# Patient Record
Sex: FEMALE | Race: BLACK OR AFRICAN AMERICAN | NOT HISPANIC OR LATINO | Employment: OTHER | URBAN - METROPOLITAN AREA
[De-identification: names, ages, dates, MRNs, and addresses within clinical notes are randomized per-mention and may not be internally consistent; named-entity substitution may affect disease eponyms.]

---

## 2018-12-22 ENCOUNTER — HOSPITAL ENCOUNTER (INPATIENT)
Facility: HOSPITAL | Age: 83
LOS: 6 days | DRG: 690 | End: 2018-12-28
Attending: EMERGENCY MEDICINE | Admitting: EMERGENCY MEDICINE
Payer: MEDICARE

## 2018-12-22 DIAGNOSIS — E87.6 HYPOKALEMIA: ICD-10-CM

## 2018-12-22 DIAGNOSIS — E86.0 DEHYDRATION: ICD-10-CM

## 2018-12-22 DIAGNOSIS — R41.82 MENTAL STATUS, DECREASED: Primary | ICD-10-CM

## 2018-12-22 DIAGNOSIS — N17.9 ACUTE RENAL FAILURE, UNSPECIFIED ACUTE RENAL FAILURE TYPE: ICD-10-CM

## 2018-12-22 DIAGNOSIS — I48.91 ATRIAL FIBRILLATION: ICD-10-CM
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
## 2018-12-22 DIAGNOSIS — N39.0 URINARY TRACT INFECTION WITHOUT HEMATURIA, SITE UNSPECIFIED: ICD-10-CM

## 2018-12-22 PROBLEM — G93.49 INFECTIOUS ENCEPHALOPATHY: Status: ACTIVE | Noted: 2018-12-22

## 2018-12-22 PROBLEM — D64.9 ANEMIA: Chronic | Status: ACTIVE | Noted: 2018-12-22

## 2018-12-22 PROBLEM — B99.9 INFECTIOUS ENCEPHALOPATHY: Status: ACTIVE | Noted: 2018-12-22

## 2018-12-22 PROBLEM — I10 ESSENTIAL HYPERTENSION: Chronic | Status: ACTIVE | Noted: 2018-12-22

## 2018-12-22 PROBLEM — N30.00 ACUTE CYSTITIS WITHOUT HEMATURIA: Status: ACTIVE | Noted: 2018-12-22

## 2018-12-22 PROBLEM — E44.0 MODERATE PROTEIN-CALORIE MALNUTRITION: Chronic | Status: ACTIVE | Noted: 2018-12-22

## 2018-12-22 PROBLEM — F01.50 VASCULAR DEMENTIA WITHOUT BEHAVIORAL DISTURBANCE: Chronic | Status: ACTIVE | Noted: 2018-12-22

## 2018-12-22 LAB
ALBUMIN SERPL BCP-MCNC: 3.3 G/DL
ALP SERPL-CCNC: 65 U/L
ALT SERPL W/O P-5'-P-CCNC: 16 U/L
ANION GAP SERPL CALC-SCNC: 15 MMOL/L
AST SERPL-CCNC: 35 U/L
BACTERIA #/AREA URNS HPF: ABNORMAL /HPF
BASOPHILS # BLD AUTO: 0.01 K/UL
BASOPHILS NFR BLD: 0.1 %
BILIRUB SERPL-MCNC: 2.1 MG/DL
BILIRUB UR QL STRIP: NEGATIVE
BNP SERPL-MCNC: 66 PG/ML
BUN SERPL-MCNC: 30 MG/DL
CALCIUM SERPL-MCNC: 8.4 MG/DL
CHLORIDE SERPL-SCNC: 104 MMOL/L
CLARITY UR: ABNORMAL
CO2 SERPL-SCNC: 24 MMOL/L
COLOR UR: ABNORMAL
CREAT SERPL-MCNC: 2.4 MG/DL
CREAT UR-MCNC: 238.3 MG/DL
DIFFERENTIAL METHOD: ABNORMAL
EOSINOPHIL # BLD AUTO: 0.1 K/UL
EOSINOPHIL NFR BLD: 0.6 %
ERYTHROCYTE [DISTWIDTH] IN BLOOD BY AUTOMATED COUNT: 15.1 %
EST. GFR  (AFRICAN AMERICAN): 20 ML/MIN/1.73 M^2
EST. GFR  (NON AFRICAN AMERICAN): 18 ML/MIN/1.73 M^2
GLUCOSE SERPL-MCNC: 100 MG/DL
GLUCOSE UR QL STRIP: NEGATIVE
HCT VFR BLD AUTO: 33 %
HGB BLD-MCNC: 11.5 G/DL
HGB UR QL STRIP: ABNORMAL
HYALINE CASTS #/AREA URNS LPF: 10 /LPF
INR PPP: 1.1
KETONES UR QL STRIP: ABNORMAL
LACTATE SERPL-SCNC: 1 MMOL/L
LEUKOCYTE ESTERASE UR QL STRIP: ABNORMAL
LYMPHOCYTES # BLD AUTO: 1.1 K/UL
LYMPHOCYTES NFR BLD: 10.6 %
MAGNESIUM SERPL-MCNC: 1.8 MG/DL
MCH RBC QN AUTO: 31.2 PG
MCHC RBC AUTO-ENTMCNC: 34.8 G/DL
MCV RBC AUTO: 89 FL
MICROSCOPIC COMMENT: ABNORMAL
MONOCYTES # BLD AUTO: 1.4 K/UL
MONOCYTES NFR BLD: 13.8 %
NEUTROPHILS # BLD AUTO: 7.7 K/UL
NEUTROPHILS NFR BLD: 74.9 %
NITRITE UR QL STRIP: NEGATIVE
PH UR STRIP: 7 [PH] (ref 5–8)
PLATELET # BLD AUTO: 248 K/UL
PMV BLD AUTO: 9.6 FL
POCT GLUCOSE: 96 MG/DL (ref 70–110)
POTASSIUM SERPL-SCNC: 2.3 MMOL/L
PROT SERPL-MCNC: 7.8 G/DL
PROT UR QL STRIP: ABNORMAL
PROTHROMBIN TIME: 11.4 SEC
RBC # BLD AUTO: 3.69 M/UL
RBC #/AREA URNS HPF: 1 /HPF (ref 0–4)
SODIUM SERPL-SCNC: 143 MMOL/L
SODIUM UR-SCNC: 44 MMOL/L
SP GR UR STRIP: 1.01 (ref 1–1.03)
SQUAMOUS #/AREA URNS HPF: 3 /HPF
TROPONIN I SERPL DL<=0.01 NG/ML-MCNC: 0.04 NG/ML
TROPONIN I SERPL DL<=0.01 NG/ML-MCNC: 0.05 NG/ML
TROPONIN I SERPL DL<=0.01 NG/ML-MCNC: 0.06 NG/ML
URN SPEC COLLECT METH UR: ABNORMAL
UROBILINOGEN UR STRIP-ACNC: ABNORMAL EU/DL
WBC # BLD AUTO: 10.24 K/UL
WBC #/AREA URNS HPF: 20 /HPF (ref 0–5)

## 2018-12-22 PROCEDURE — 84300 ASSAY OF URINE SODIUM: CPT

## 2018-12-22 PROCEDURE — 63600175 PHARM REV CODE 636 W HCPCS: Performed by: EMERGENCY MEDICINE

## 2018-12-22 PROCEDURE — P9612 CATHETERIZE FOR URINE SPEC: HCPCS

## 2018-12-22 PROCEDURE — 96365 THER/PROPH/DIAG IV INF INIT: CPT

## 2018-12-22 PROCEDURE — 83735 ASSAY OF MAGNESIUM: CPT

## 2018-12-22 PROCEDURE — 93005 ELECTROCARDIOGRAM TRACING: CPT

## 2018-12-22 PROCEDURE — 82570 ASSAY OF URINE CREATININE: CPT

## 2018-12-22 PROCEDURE — 83605 ASSAY OF LACTIC ACID: CPT

## 2018-12-22 PROCEDURE — 85025 COMPLETE CBC W/AUTO DIFF WBC: CPT

## 2018-12-22 PROCEDURE — 87088 URINE BACTERIA CULTURE: CPT

## 2018-12-22 PROCEDURE — 80053 COMPREHEN METABOLIC PANEL: CPT

## 2018-12-22 PROCEDURE — 11000001 HC ACUTE MED/SURG PRIVATE ROOM

## 2018-12-22 PROCEDURE — 87086 URINE CULTURE/COLONY COUNT: CPT

## 2018-12-22 PROCEDURE — 25000003 PHARM REV CODE 250: Performed by: EMERGENCY MEDICINE

## 2018-12-22 PROCEDURE — 85610 PROTHROMBIN TIME: CPT

## 2018-12-22 PROCEDURE — 93010 ELECTROCARDIOGRAM REPORT: CPT | Mod: ,,, | Performed by: INTERNAL MEDICINE

## 2018-12-22 PROCEDURE — 84484 ASSAY OF TROPONIN QUANT: CPT

## 2018-12-22 PROCEDURE — 96361 HYDRATE IV INFUSION ADD-ON: CPT

## 2018-12-22 PROCEDURE — 81000 URINALYSIS NONAUTO W/SCOPE: CPT

## 2018-12-22 PROCEDURE — 83880 ASSAY OF NATRIURETIC PEPTIDE: CPT

## 2018-12-22 PROCEDURE — 93010 EKG 12-LEAD: ICD-10-PCS | Mod: ,,, | Performed by: INTERNAL MEDICINE

## 2018-12-22 PROCEDURE — 82962 GLUCOSE BLOOD TEST: CPT

## 2018-12-22 PROCEDURE — 99285 EMERGENCY DEPT VISIT HI MDM: CPT | Mod: 25

## 2018-12-22 PROCEDURE — 87077 CULTURE AEROBIC IDENTIFY: CPT

## 2018-12-22 PROCEDURE — 87186 SC STD MICRODIL/AGAR DIL: CPT

## 2018-12-22 PROCEDURE — 36415 COLL VENOUS BLD VENIPUNCTURE: CPT

## 2018-12-22 RX ORDER — LABETALOL HYDROCHLORIDE 5 MG/ML
10 INJECTION, SOLUTION INTRAVENOUS
Status: DISCONTINUED | OUTPATIENT
Start: 2018-12-22 | End: 2018-12-22

## 2018-12-22 RX ORDER — SODIUM CHLORIDE 9 MG/ML
125 INJECTION, SOLUTION INTRAVENOUS ONCE
Status: COMPLETED | OUTPATIENT
Start: 2018-12-22 | End: 2018-12-22

## 2018-12-22 RX ORDER — LOSARTAN POTASSIUM 25 MG/1
100 TABLET ORAL DAILY
Status: DISCONTINUED | OUTPATIENT
Start: 2018-12-22 | End: 2018-12-28 | Stop reason: HOSPADM

## 2018-12-22 RX ORDER — DONEPEZIL HYDROCHLORIDE 5 MG/1
5 TABLET, FILM COATED ORAL NIGHTLY
Status: DISCONTINUED | OUTPATIENT
Start: 2018-12-22 | End: 2018-12-28 | Stop reason: HOSPADM

## 2018-12-22 RX ORDER — ENOXAPARIN SODIUM 100 MG/ML
30 INJECTION SUBCUTANEOUS EVERY 24 HOURS
Status: DISCONTINUED | OUTPATIENT
Start: 2018-12-22 | End: 2018-12-24

## 2018-12-22 RX ORDER — AMLODIPINE BESYLATE 5 MG/1
10 TABLET ORAL DAILY
Status: DISCONTINUED | OUTPATIENT
Start: 2018-12-22 | End: 2018-12-28 | Stop reason: HOSPADM

## 2018-12-22 RX ORDER — CLONIDINE HYDROCHLORIDE 0.1 MG/1
0.1 TABLET ORAL EVERY 4 HOURS PRN
Status: DISCONTINUED | OUTPATIENT
Start: 2018-12-22 | End: 2018-12-27

## 2018-12-22 RX ADMIN — POTASSIUM CHLORIDE: 2 INJECTION, SOLUTION, CONCENTRATE INTRAVENOUS at 10:12

## 2018-12-22 RX ADMIN — CEFTRIAXONE 1 G: 1 INJECTION, SOLUTION INTRAVENOUS at 12:12

## 2018-12-22 RX ADMIN — POTASSIUM CHLORIDE: 2 INJECTION, SOLUTION, CONCENTRATE INTRAVENOUS at 01:12

## 2018-12-22 RX ADMIN — ENOXAPARIN SODIUM 30 MG: 100 INJECTION SUBCUTANEOUS at 06:12

## 2018-12-22 RX ADMIN — SODIUM CHLORIDE 125 ML/HR: 0.9 INJECTION, SOLUTION INTRAVENOUS at 11:12

## 2018-12-22 RX ADMIN — POTASSIUM CHLORIDE: 2 INJECTION, SOLUTION, CONCENTRATE INTRAVENOUS at 12:12

## 2018-12-22 NOTE — ASSESSMENT & PLAN NOTE
She has amlodipine 10 and losartan 100 listed on home med list. Hypertensive on admit. Cont home meds with PRN.

## 2018-12-22 NOTE — NURSING
"Patient's family at the bedside. Patient's daughter says that her and her brother are not able to provide the care that their mom needs. "She needs 24 hour care and we cant do that." The patient has had several falls over the last few days. She consistently tries to get out of the bed. Patient has not had a bowel movement in over week. Daughter also says that patient's urine is very dark and has a bad odor.   "

## 2018-12-22 NOTE — ASSESSMENT & PLAN NOTE
Cr 2.4 on admission. She has renal insufficiency listed under her medical problems in Epic, but her previous Cr values which are from 2015 were all 0.8. She appears volume depleted on exam. She was started on IV fluids. Monitor BMP. Retroperitoneal US pending.

## 2018-12-22 NOTE — ASSESSMENT & PLAN NOTE
Suspect acute delirium from infection in addition to chronic dementia. Treat underlying infection and monitor.

## 2018-12-22 NOTE — ASSESSMENT & PLAN NOTE
Unclear etiology.  No previous workup appreciated in chart.  No obvious bleeding and H/H lower than previously documented 3 years ago.  Will get workup ordered for am blood draw and monitor.

## 2018-12-22 NOTE — ED TRIAGE NOTES
"Pt arrived to the ED via EMS with c/o fall. Per pt daughter, "states her mother has been having multiple falls this week with last one on last night and more confused. Says she went to mother home and mother didn't recognize her". On admit to the hospital, pt is AAOx 2 pt not oriented to time. NAD noted. Pt denies chest pain/discomfort, SOB, fevers, but reports knee pain and weakness. Dr. Gastelum at bedside. Bed locked and in lowest position, SR up x2, call light within reach. Will continue to monitor and care for patient.  "

## 2018-12-22 NOTE — ED PROVIDER NOTES
"Encounter Date: 12/22/2018    SCRIBE #1 NOTE: I, Nadine Puneet, am scribing for, and in the presence of,  Jamie Gastelum MD. I have scribed the following portions of the note - Other sections scribed: HPI and ROS.       History     Chief Complaint   Patient presents with    Fall     family reports multiple falls over past two days, due to increased weakness, Hx of dementia. C-collar in place     CC: Fall    HPI: This 87 y.o. Female with PMHx of dementia, Hypertension, Renal disorder, and Stroke presents to the ED for an evaluation following 2 falls that occurred in the past 2 days. Pt's daughter states pt stayed in the xavier chair all night, and pt looked confused so daughter brought pt to the ED. Daughter states pt normally walks with a cane. Pt reports neck pain secondary to fall. Pt also reports chronic right knee pain due to arthritis. Daughter states pt has not been eating as much as usual. Pt denies fever, SOB, chest pain, and any other associated symptoms.        The history is provided by the patient. No  was used.     Review of patient's allergies indicates:   Allergen Reactions    Aspirin Other (See Comments)     Pt states " I get nervous"     Past Medical History:   Diagnosis Date    Dementia     Hypertension     Renal disorder     Stroke      Past Surgical History:   Procedure Laterality Date    CHOLECYSTECTOMY-LAPAROSCOPIC N/A 9/25/2015    Performed by Cristian Escalera MD at [facility]     History reviewed. No pertinent family history.  Social History     Tobacco Use    Smoking status: Former Smoker    Smokeless tobacco: Never Used   Substance Use Topics    Alcohol use: No    Drug use: No     Review of Systems   Constitutional: Negative for chills, diaphoresis and fever.   HENT: Negative for ear pain and sore throat.    Eyes: Negative for pain.   Respiratory: Negative for cough and shortness of breath.    Cardiovascular: Negative for chest pain.   Gastrointestinal: " Negative for abdominal pain, diarrhea, nausea and vomiting.   Genitourinary: Negative for dysuria.   Musculoskeletal: Positive for arthralgias (chronic right knee pain) and neck pain. Negative for back pain.   Skin: Negative for rash.   Neurological: Negative for headaches.   Psychiatric/Behavioral: Positive for confusion.       Physical Exam     Initial Vitals [12/22/18 1004]   BP Pulse Resp Temp SpO2   (!) 190/77 85 18 98.8 °F (37.1 °C) (!) 92 %      MAP       --         Physical Exam    Nursing note and vitals reviewed.  Constitutional: She appears well-developed and well-nourished. She appears distressed.   HENT:   Dry oropharynx   Eyes: EOM are normal. Pupils are equal, round, and reactive to light.   Neck: Normal range of motion. Neck supple. No thyromegaly present. No JVD present.   Cardiovascular: Normal rate, regular rhythm, normal heart sounds and intact distal pulses. Exam reveals no gallop and no friction rub.    No murmur heard.  Pulmonary/Chest: Breath sounds normal. No respiratory distress.   Abdominal: Soft. Bowel sounds are normal. There is no tenderness.   Musculoskeletal: Normal range of motion. She exhibits no edema or tenderness.   Neurological: She is oriented to person, place, and time.   Lethargic but able to answer questions.  Global weakness but no focal deficit   Skin: Skin is warm and dry.   Poor skin turgor         ED Course   Critical Care  Date/Time: 12/22/2018 6:53 PM  Performed by: Jamie Gastelum MD  Authorized by: Jamie Gastelum MD   Direct patient critical care time: 20 minutes  Additional history critical care time: 10 minutes  Ordering / reviewing critical care time: 10 minutes  Documentation critical care time: 10 minutes  Consulting other physicians critical care time: 10 minutes  Consult with family critical care time: 10 minutes  Other critical care time: 10 (admission) minutes  Total critical care time (exclusive of procedural time) : 80 minutes  Critical care  time was exclusive of separately billable procedures and treating other patients and teaching time.  Critical care was necessary to treat or prevent imminent or life-threatening deterioration of the following conditions: dehydration, renal failure and CNS failure or compromise.  Critical care was time spent personally by me on the following activities: development of treatment plan with patient or surrogate, discussions with consultants, interpretation of cardiac output measurements, evaluation of patient's response to treatment, examination of patient, obtaining history from patient or surrogate, ordering and performing treatments and interventions, ordering and review of laboratory studies, ordering and review of radiographic studies, pulse oximetry, re-evaluation of patient's condition and review of old charts.        Labs Reviewed   CBC W/ AUTO DIFFERENTIAL - Abnormal; Notable for the following components:       Result Value    RBC 3.69 (*)     Hemoglobin 11.5 (*)     Hematocrit 33.0 (*)     MCH 31.2 (*)     RDW 15.1 (*)     Mono # 1.4 (*)     Gran% 74.9 (*)     Lymph% 10.6 (*)     All other components within normal limits   COMPREHENSIVE METABOLIC PANEL - Abnormal; Notable for the following components:    Potassium 2.3 (*)     BUN, Bld 30 (*)     Creatinine 2.4 (*)     Calcium 8.4 (*)     Albumin 3.3 (*)     Total Bilirubin 2.1 (*)     eGFR if  20 (*)     eGFR if non  18 (*)     All other components within normal limits    Narrative:     POTASSIUM critical result(s) called and verbal readback obtained from   EL ALANIZ, 12/22/2018 11:31   URINALYSIS - Abnormal; Notable for the following components:    Appearance, UA Cloudy (*)     Protein, UA 2+ (*)     Ketones, UA Trace (*)     Occult Blood UA 1+ (*)     Urobilinogen, UA 4.0-6.0 (*)     Leukocytes, UA 3+ (*)     All other components within normal limits   TROPONIN I - Abnormal; Notable for the following components:     Troponin I 0.063 (*)     All other components within normal limits   URINALYSIS MICROSCOPIC - Abnormal; Notable for the following components:    WBC, UA 20 (*)     Bacteria, UA Many (*)     Hyaline Casts, UA 10 (*)     All other components within normal limits   TROPONIN I - Abnormal; Notable for the following components:    Troponin I 0.054 (*)     All other components within normal limits   CULTURE, URINE   LACTIC ACID, PLASMA   PROTIME-INR   B-TYPE NATRIURETIC PEPTIDE   MAGNESIUM   CREATININE, URINE, RANDOM   SODIUM, URINE, RANDOM   POCT GLUCOSE   POCT GLUCOSE MONITORING CONTINUOUS          Imaging Results          X-Ray Chest AP Portable (Final result)  Result time 12/22/18 11:29:57    Final result by Oziel Ann MD (12/22/18 11:29:57)                 Impression:      No focal lobar consolidation.      Electronically signed by: Oziel Ann MD  Date:    12/22/2018  Time:    11:29             Narrative:    EXAMINATION:  XR CHEST AP PORTABLE    CLINICAL HISTORY:  cough;    TECHNIQUE:  Single frontal view of the chest was performed.    COMPARISON:  02/06/2015    FINDINGS:  Cardiovascular silhouette borderline prominent.  Atherosclerotic change within the aorta.  The lungs are clear.  No evidence for congestion, effusion, or infiltrate.                               CT Head Without Contrast (Final result)  Result time 12/22/18 11:23:02    Final result by Rachana Reynolds MD (12/22/18 11:23:02)                 Impression:      No acute intracranial abnormality.      Electronically signed by: Rachana Reynolds MD  Date:    12/22/2018  Time:    11:23             Narrative:    EXAMINATION:  CT HEAD WITHOUT CONTRAST    CLINICAL HISTORY:  Confusion/delirium, altered LOC, unexplained;    TECHNIQUE:  Low dose axial images were obtained through the head.  Coronal and sagittal reformations were also performed. Contrast was not administered.    COMPARISON:  None.    FINDINGS:  The brain parenchyma demonstrates no  evidence of intracranial hemorrhage, major vascular distribution infarct or mass effect.  There is generalized cerebral and cerebellar volume loss with the frontal predominance, similar to previous exams.  There is stable chronic microvascular ischemic change.  There are no extra-axial masses or fluid collections.  The ventricular system is of normal size for age and midline.    Visualized paranasal sinuses and mastoid air cells are clear.  There is no evidence of skull fracture.  A stable calcified meningioma is present along the cribriform plate, measuring approximately 1.2 cm without surrounding edema..                               CT CERVICAL SPINE WITHOUT CONTRAST (Final result)  Result time 12/22/18 11:29:59    Final result by Rachana Reynolds MD (12/22/18 11:29:59)                 Impression:      No acute fracture.    Osteopenia and degenerative change as above.      Electronically signed by: Rachana Reynolds MD  Date:    12/22/2018  Time:    11:29             Narrative:    EXAMINATION:  CT CERVICAL SPINE WITHOUT CONTRAST    CLINICAL HISTORY:  neck pain;    TECHNIQUE:  Low dose axial images, sagittal and coronal reformations were performed though the cervical spine.  Contrast was not administered.    COMPARISON:  Prior dated 11/22/2015.    FINDINGS:  There is diffuse osteopenia.  There is no acute fracture, dislocation, or bone destruction.  There is minimal grade 1 anterolisthesis of C5-6.  Mild disc height narrowing is present at the lower lumbar levels.  Endplate degenerative change with marginal osteophyte formation as well as uncovertebral spurring and facet arthropathy are present throughout the cervical spine.  These degenerative changes are associated with neural foraminal narrowing at C3-4 on the left C4-5 and C5-6 and C6-7 bilaterally.  Degenerative changes appear similar to previous exam.    There are extensive vascular calcifications noted at the carotid bulbs and subclavian vessels.                                 patient has severe dehydration with severe hypokalemia and acute renal failure also with UTI.  Causing decreased mental status.  Will admit for IV hydration, potassium replacement, renal failure evaluation.  Discussed with Dr. Josiah Wolf who accepted the patient.             Scribe Attestation:   Scribe #1: I performed the above scribed service and the documentation accurately describes the services I performed. I attest to the accuracy of the note.    Attending Attestation:           Physician Attestation for Scribe:  Physician Attestation Statement for Scribe #1: I, Jamie Gastelum MD, reviewed documentation, as scribed by Nadine Teixeira in my presence, and it is both accurate and complete.                    Clinical Impression:   The primary encounter diagnosis was Mental status, decreased. Diagnoses of Fall, Dehydration, Urinary tract infection without hematuria, site unspecified, Acute renal failure, unspecified acute renal failure type, and Hypokalemia were also pertinent to this visit.                             Jamie Gastelum MD  12/22/18 7418

## 2018-12-22 NOTE — H&P
[facility] Hospital Medicine  History & Physical    Patient Name: Nik Fakl  MRN: [id number]  Admission Date: 2018  Attending Physician: Purnima Clarke MD  Primary Care Provider: Ronen Leong MD         Patient information was obtained from patient, past medical records and ER records.     Subjective:     Principal Problem:Acute cystitis without hematuria    Chief Complaint:   Chief Complaint   Patient presents with    Fall     family reports multiple falls over past two days, due to increased weakness, Hx of dementia. C-collar in place        HPI: Ms. Falk is an 86 yo woman with advanced dementia, h/o stroke, essential hypertension, and questionable h/o CKD who presented to the ER with her daughter for confusion and falls. History is limited due to patient's dementia. Attempts to contact her daughter Yamilex x2 unsuccessful. Per the ER physician, the daughter reports that her mom normally ambulates adequately with a cane at home but has fallen twice in the past two days. The patient has reprotedly not been eating as much as usual. She didn't seem to recognize her daughter and was confused this morning which prompted the patient's daughter to bring her to the ER.  The patient denies current fevers, chills, abdominal discomfort, pelvic discomfort, chest pain. She does report knee pain. In the ER the patient was confused believing that she was at home but was oriented to her name and . Could not tell me the year. Was able to tell me her daughter's name is Yamilex.  Lab evaluation revealed likely pre-renal ARF as well as findings consistent with acute bacterial cystitis on UA. The patient was admitted to hospital medicine for further care.    Past Medical History:   Diagnosis Date    Dementia     Hypertension     Renal disorder     Stroke        Past Surgical History:   Procedure Laterality Date    CHOLECYSTECTOMY-LAPAROSCOPIC N/A 2015    Performed by Cristian MITTAL  "MD Jw at [facility] OR       Review of patient's allergies indicates:   Allergen Reactions    Aspirin Other (See Comments)     Pt states " I get nervous"       No current facility-administered medications on file prior to encounter.      Current Outpatient Medications on File Prior to Encounter   Medication Sig    amlodipine (NORVASC) 10 MG tablet Take 10 mg by mouth once daily.    aspirin (ECOTRIN) 81 MG EC tablet Take 81 mg by mouth once daily.    brimonidine-timolol (COMBIGAN) 0.2-0.5 % Drop Place 1 drop into both eyes.    losartan (COZAAR) 100 MG tablet Take 100 mg by mouth once daily.    donepezil (ARICEPT) 5 MG tablet Take 5 mg by mouth 2 (two) times daily.    montelukast (SINGULAIR) 10 mg tablet Take 10 mg by mouth once daily.     tramadol (ULTRAM) 50 mg tablet Take 50 mg by mouth every evening.     trazodone (DESYREL) 50 MG tablet Take 50 mg by mouth every evening.     Family History     None        Tobacco Use    Smoking status: Former Smoker    Smokeless tobacco: Never Used   Substance and Sexual Activity    Alcohol use: No    Drug use: No    Sexual activity: Not Currently     Review of Systems   Unable to perform ROS: Dementia     Objective:     Vital Signs (Most Recent):  Temp: 98.8 °F (37.1 °C) (12/22/18 1004)  Pulse: 83 (12/22/18 1302)  Resp: 18 (12/22/18 1004)  BP: (!) 176/85 (12/22/18 1302)  SpO2: 100 % (12/22/18 1302) Vital Signs (24h Range):  Temp:  [98.8 °F (37.1 °C)] 98.8 °F (37.1 °C)  Pulse:  [82-85] 83  Resp:  [18] 18  SpO2:  [92 %-100 %] 100 %  BP: (154-190)/(69-85) 176/85     Weight: 77.1 kg (170 lb)  Body mass index is 28.29 kg/m².    Physical Exam   Constitutional: She appears well-developed and well-nourished. No distress.   HENT:   Right Ear: External ear normal.   Left Ear: External ear normal.   Nose: Nose normal.   Eyes: EOM are normal. Pupils are equal, round, and reactive to light. No scleral icterus.   Neck: Normal range of motion. Neck supple. No thyromegaly " present.   Cardiovascular: Normal heart sounds. Exam reveals no friction rub.   No murmur heard.  Occasional irregularity. Good radial pulses.   Pulmonary/Chest: Effort normal and breath sounds normal. No respiratory distress. She has no wheezes. She has no rales.   Abdominal: Soft. Bowel sounds are normal. She exhibits no mass. There is no tenderness.   No hepatosplenomegaly   Musculoskeletal: Normal range of motion. She exhibits no edema or deformity.   Neurological: She is alert. No cranial nerve deficit.   Oriented to self and , not oriented to place or year.   Skin: Skin is warm and dry. Capillary refill takes less than 2 seconds. No pallor.         CRANIAL NERVES     CN III, IV, VI   Pupils are equal, round, and reactive to light.  Extraocular motions are normal.        Significant Labs: All pertinent labs within the past 24 hours have been reviewed.    Significant Imaging: I have reviewed and interpreted all pertinent imaging results/findings within the past 24 hours.    Assessment/Plan:     * Acute cystitis without hematuria    Patient presented with confusion suspected to be due to infectious encephalopathy from UTI. UA consistent with UTI. UCx pending. She was started empirically on ceftriaxone.      ARF (acute renal failure)    Cr 2.4 on admission. She has renal insufficiency listed under her medical problems in Epic, but her previous Cr values which are from 2015 were all 0.8. She appears volume depleted on exam. She was started on IV fluids. Monitor BMP. Retroperitoneal US pending.     Infectious encephalopathy    Suspect acute delirium from infection in addition to chronic dementia. Treat underlying infection and monitor.     Moderate protein-calorie malnutrition    Thin with muscle wasting. Supplement when more alert and safe to take PO.     Vascular dementia without behavioral disturbance    Suspected vascular dementia with past history of stroke. Cont home Aricept.     Hypokalemia    Replete and  monitor.       Essential hypertension    She has amlodipine 10 and losartan 100 listed on home med list. Hypertensive on admit. Cont home meds with PRN.       VTE Risk Mitigation (From admission, onward)        Ordered     enoxaparin injection 30 mg  Daily      12/22/18 1329     IP VTE HIGH RISK PATIENT  Once      12/22/18 1329             Purnima Clarke MD  Department of Hospital Medicine   [facility]

## 2018-12-22 NOTE — ASSESSMENT & PLAN NOTE
Patient presented with confusion suspected to be due to infectious encephalopathy from UTI. UA consistent with UTI. UCx pending. She was started empirically on ceftriaxone.

## 2018-12-22 NOTE — SUBJECTIVE & OBJECTIVE
"Past Medical History:   Diagnosis Date    Dementia     Hypertension     Renal disorder     Stroke        Past Surgical History:   Procedure Laterality Date    CHOLECYSTECTOMY-LAPAROSCOPIC N/A 9/25/2015    Performed by Cristian Escalera MD at [facility]       Review of patient's allergies indicates:   Allergen Reactions    Aspirin Other (See Comments)     Pt states " I get nervous"       No current facility-administered medications on file prior to encounter.      Current Outpatient Medications on File Prior to Encounter   Medication Sig    amlodipine (NORVASC) 10 MG tablet Take 10 mg by mouth once daily.    aspirin (ECOTRIN) 81 MG EC tablet Take 81 mg by mouth once daily.    brimonidine-timolol (COMBIGAN) 0.2-0.5 % Drop Place 1 drop into both eyes.    losartan (COZAAR) 100 MG tablet Take 100 mg by mouth once daily.    donepezil (ARICEPT) 5 MG tablet Take 5 mg by mouth 2 (two) times daily.    montelukast (SINGULAIR) 10 mg tablet Take 10 mg by mouth once daily.     tramadol (ULTRAM) 50 mg tablet Take 50 mg by mouth every evening.     trazodone (DESYREL) 50 MG tablet Take 50 mg by mouth every evening.     Family History     None        Tobacco Use    Smoking status: Former Smoker    Smokeless tobacco: Never Used   Substance and Sexual Activity    Alcohol use: No    Drug use: No    Sexual activity: Not Currently     Review of Systems   Unable to perform ROS: Dementia     Objective:     Vital Signs (Most Recent):  Temp: 98.8 °F (37.1 °C) (12/22/18 1004)  Pulse: 83 (12/22/18 1302)  Resp: 18 (12/22/18 1004)  BP: (!) 176/85 (12/22/18 1302)  SpO2: 100 % (12/22/18 1302) Vital Signs (24h Range):  Temp:  [98.8 °F (37.1 °C)] 98.8 °F (37.1 °C)  Pulse:  [82-85] 83  Resp:  [18] 18  SpO2:  [92 %-100 %] 100 %  BP: (154-190)/(69-85) 176/85     Weight: 77.1 kg (170 lb)  Body mass index is 28.29 kg/m².    Physical Exam   Constitutional: She appears well-developed and well-nourished. No distress.   HENT:   Right Ear: " External ear normal.   Left Ear: External ear normal.   Nose: Nose normal.   Eyes: EOM are normal. Pupils are equal, round, and reactive to light. No scleral icterus.   Neck: Normal range of motion. Neck supple. No thyromegaly present.   Cardiovascular: Normal heart sounds. Exam reveals no friction rub.   No murmur heard.  Occasional irregularity. Good radial pulses.   Pulmonary/Chest: Effort normal and breath sounds normal. No respiratory distress. She has no wheezes. She has no rales.   Abdominal: Soft. Bowel sounds are normal. She exhibits no mass. There is no tenderness.   No hepatosplenomegaly   Musculoskeletal: Normal range of motion. She exhibits no edema or deformity.   Neurological: She is alert. No cranial nerve deficit.   Oriented to self and , not oriented to place or year.   Skin: Skin is warm and dry. Capillary refill takes less than 2 seconds. No pallor.         CRANIAL NERVES     CN III, IV, VI   Pupils are equal, round, and reactive to light.  Extraocular motions are normal.        Significant Labs: All pertinent labs within the past 24 hours have been reviewed.    Significant Imaging: I have reviewed and interpreted all pertinent imaging results/findings within the past 24 hours.

## 2018-12-22 NOTE — HPI
Ms. Falk is an 88 yo woman with advanced dementia, h/o stroke, essential hypertension, and questionable h/o CKD who presented to the ER with her daughter for confusion and falls. History is limited due to patient's dementia. Attempts to contact her daughter Yamilex x2 unsuccessful. Per the ER physician, the daughter reports that her mom normally ambulates adequately with a cane at home but has fallen twice in the past two days. The patient has reprotedly not been eating as much as usual. She didn't seem to recognize her daughter and was confused this morning which prompted the patient's daughter to bring her to the ER.  The patient denies current fevers, chills, abdominal discomfort, pelvic discomfort, chest pain. She does report knee pain. In the ER the patient was confused believing that she was at home but was oriented to her name and . Could not tell me the year. Was able to tell me her daughter's name is Yamilex.  Lab evaluation revealed likely pre-renal ARF as well as findings consistent with acute bacterial cystitis on UA. The patient was admitted to hospital medicine for further care.

## 2018-12-23 LAB
ANION GAP SERPL CALC-SCNC: 11 MMOL/L
BASOPHILS # BLD AUTO: 0.01 K/UL
BASOPHILS NFR BLD: 0.1 %
BUN SERPL-MCNC: 22 MG/DL
CALCIUM SERPL-MCNC: 7.5 MG/DL
CHLORIDE SERPL-SCNC: 114 MMOL/L
CO2 SERPL-SCNC: 22 MMOL/L
CREAT SERPL-MCNC: 0.8 MG/DL
DIFFERENTIAL METHOD: ABNORMAL
EOSINOPHIL # BLD AUTO: 0.3 K/UL
EOSINOPHIL NFR BLD: 3.1 %
ERYTHROCYTE [DISTWIDTH] IN BLOOD BY AUTOMATED COUNT: 15.3 %
EST. GFR  (AFRICAN AMERICAN): >60 ML/MIN/1.73 M^2
EST. GFR  (NON AFRICAN AMERICAN): >60 ML/MIN/1.73 M^2
GLUCOSE SERPL-MCNC: 85 MG/DL
HCT VFR BLD AUTO: 30.3 %
HGB BLD-MCNC: 10.4 G/DL
LYMPHOCYTES # BLD AUTO: 2.2 K/UL
LYMPHOCYTES NFR BLD: 26 %
MCH RBC QN AUTO: 30.8 PG
MCHC RBC AUTO-ENTMCNC: 34.3 G/DL
MCV RBC AUTO: 90 FL
MONOCYTES # BLD AUTO: 0.8 K/UL
MONOCYTES NFR BLD: 9.3 %
NEUTROPHILS # BLD AUTO: 5.1 K/UL
NEUTROPHILS NFR BLD: 61.5 %
PLATELET # BLD AUTO: 216 K/UL
PMV BLD AUTO: 9.4 FL
POTASSIUM SERPL-SCNC: 3.1 MMOL/L
RBC # BLD AUTO: 3.38 M/UL
SODIUM SERPL-SCNC: 147 MMOL/L
WBC # BLD AUTO: 8.35 K/UL

## 2018-12-23 PROCEDURE — 25000003 PHARM REV CODE 250: Performed by: INTERNAL MEDICINE

## 2018-12-23 PROCEDURE — 63600175 PHARM REV CODE 636 W HCPCS: Performed by: EMERGENCY MEDICINE

## 2018-12-23 PROCEDURE — 36415 COLL VENOUS BLD VENIPUNCTURE: CPT

## 2018-12-23 PROCEDURE — 25000003 PHARM REV CODE 250: Performed by: HOSPITALIST

## 2018-12-23 PROCEDURE — 85025 COMPLETE CBC W/AUTO DIFF WBC: CPT

## 2018-12-23 PROCEDURE — 80048 BASIC METABOLIC PNL TOTAL CA: CPT

## 2018-12-23 PROCEDURE — 11000001 HC ACUTE MED/SURG PRIVATE ROOM

## 2018-12-23 PROCEDURE — 63600175 PHARM REV CODE 636 W HCPCS: Performed by: HOSPITALIST

## 2018-12-23 PROCEDURE — 94761 N-INVAS EAR/PLS OXIMETRY MLT: CPT

## 2018-12-23 RX ORDER — ASPIRIN 81 MG/1
81 TABLET ORAL DAILY
Status: DISCONTINUED | OUTPATIENT
Start: 2018-12-23 | End: 2018-12-28 | Stop reason: HOSPADM

## 2018-12-23 RX ORDER — TRAMADOL HYDROCHLORIDE 50 MG/1
50 TABLET ORAL NIGHTLY
Status: DISCONTINUED | OUTPATIENT
Start: 2018-12-23 | End: 2018-12-24

## 2018-12-23 RX ORDER — SODIUM CHLORIDE AND POTASSIUM CHLORIDE 150; 450 MG/100ML; MG/100ML
INJECTION, SOLUTION INTRAVENOUS CONTINUOUS
Status: DISCONTINUED | OUTPATIENT
Start: 2018-12-23 | End: 2018-12-24

## 2018-12-23 RX ADMIN — CEFTRIAXONE 1 G: 1 INJECTION, SOLUTION INTRAVENOUS at 12:12

## 2018-12-23 RX ADMIN — ENOXAPARIN SODIUM 30 MG: 100 INJECTION SUBCUTANEOUS at 05:12

## 2018-12-23 RX ADMIN — AMLODIPINE BESYLATE 10 MG: 5 TABLET ORAL at 09:12

## 2018-12-23 RX ADMIN — LOSARTAN POTASSIUM 100 MG: 25 TABLET, FILM COATED ORAL at 09:12

## 2018-12-23 RX ADMIN — CLONIDINE HYDROCHLORIDE 0.1 MG: 0.1 TABLET ORAL at 05:12

## 2018-12-23 RX ADMIN — ASPIRIN 81 MG: 81 TABLET, COATED ORAL at 03:12

## 2018-12-23 RX ADMIN — TRAMADOL HYDROCHLORIDE 50 MG: 50 TABLET, FILM COATED ORAL at 08:12

## 2018-12-23 RX ADMIN — CLONIDINE HYDROCHLORIDE 0.1 MG: 0.1 TABLET ORAL at 01:12

## 2018-12-23 RX ADMIN — SODIUM CHLORIDE AND POTASSIUM CHLORIDE: 4.5; 1.49 INJECTION, SOLUTION INTRAVENOUS at 10:12

## 2018-12-23 RX ADMIN — CLONIDINE HYDROCHLORIDE 0.1 MG: 0.1 TABLET ORAL at 12:12

## 2018-12-23 RX ADMIN — DONEPEZIL HYDROCHLORIDE 5 MG: 5 TABLET, FILM COATED ORAL at 08:12

## 2018-12-23 NOTE — PLAN OF CARE
Problem: Adult Inpatient Plan of Care  Goal: Plan of Care Review  Outcome: Ongoing (interventions implemented as appropriate)   12/23/18 0346   Plan of Care Review   Plan of Care Reviewed With patient   Pt AAOx2. Skin Warm, Dry and Intact. IV patent. Fluids infusing. Tolerating well. No s/s of distress or discomfort. Turned Q2hrs. Incontinence care provided.  Bowel sounds active in all 4 quads. Denies pain. Safety maintained. Rounded on q2hrs.  Call bell within reach, bed in lowest position. Verbalized understanding to call for assistance when needed.

## 2018-12-23 NOTE — HOSPITAL COURSE
Ms. Falk was admitted with AMS due to acute cystitis and ARF. She does have underlying dementia at baseline. Head CT and C-spine was overall unremarkable for any acute abnormalities. CXR was negative for infection. Pt was noted to have UA consistent with infection and had increase in BUN/creatinine of 30/2.4 and hypokalemia. Pt was started on empiric Rocephin and urine culture showed GNR. Blood cultures were not done on admission. She was also treated with IVF and had close monitoring of renal function, and supplementation of her potassium. She became agitated overnight on 12/24. Per her family, this is baseline- she wanders out of the house and down the street at times at night. She went into Afib RVR on 12/24 that resolved with IV metoprolol x1. Discussed with family, started metoprolol and eliquis. TTE 12/24 with LVH, normal EF and diastolic function. Remains hypertensive- will change metoprolol to coreg. Still hypertensive- adjusting BP meds. Family is requesting nursing home placement; PPD placed. PT, OT recommending SNF. Patient completed antibiotics for UTI. Multiple changes made in BP meds. Discharged to nursing home.

## 2018-12-23 NOTE — PLAN OF CARE
Problem: Adult Inpatient Plan of Care  Goal: Plan of Care Review  Pt confused and redirection given, pt turn Q2, AvaSys maintained, dtr at bedside assisting w/care, medicated x2 Clonidine 0.1mg, safety maintained, IVF cont and IV abx per order, bed low locked and in position, will cont plan of care

## 2018-12-23 NOTE — PLAN OF CARE
12/23/18 1648   Discharge Assessment   Assessment Type Discharge Planning Assessment   To patient's room to complete DC needs assessment.  Patient unable to participate.  No family present.

## 2018-12-24 PROBLEM — E83.42 HYPOMAGNESEMIA: Status: ACTIVE | Noted: 2018-12-24

## 2018-12-24 PROBLEM — I48.0 PAROXYSMAL ATRIAL FIBRILLATION WITH RVR: Status: ACTIVE | Noted: 2018-12-24

## 2018-12-24 LAB
ANION GAP SERPL CALC-SCNC: 13 MMOL/L
BACTERIA UR CULT: NORMAL
BASOPHILS # BLD AUTO: 0.02 K/UL
BASOPHILS NFR BLD: 0.2 %
BUN SERPL-MCNC: 12 MG/DL
CALCIUM SERPL-MCNC: 8.1 MG/DL
CHLORIDE SERPL-SCNC: 109 MMOL/L
CO2 SERPL-SCNC: 19 MMOL/L
CREAT SERPL-MCNC: 0.7 MG/DL
DIFFERENTIAL METHOD: ABNORMAL
EOSINOPHIL # BLD AUTO: 0.1 K/UL
EOSINOPHIL NFR BLD: 1.1 %
ERYTHROCYTE [DISTWIDTH] IN BLOOD BY AUTOMATED COUNT: 15.1 %
EST. GFR  (AFRICAN AMERICAN): >60 ML/MIN/1.73 M^2
EST. GFR  (NON AFRICAN AMERICAN): >60 ML/MIN/1.73 M^2
FERRITIN SERPL-MCNC: 386 NG/ML
FOLATE SERPL-MCNC: 5.9 NG/ML
GLUCOSE SERPL-MCNC: 84 MG/DL
HCT VFR BLD AUTO: 31.5 %
HGB BLD-MCNC: 10.9 G/DL
IRON SERPL-MCNC: 49 UG/DL
LYMPHOCYTES # BLD AUTO: 1.8 K/UL
LYMPHOCYTES NFR BLD: 21.3 %
MAGNESIUM SERPL-MCNC: 1.2 MG/DL
MCH RBC QN AUTO: 30.9 PG
MCHC RBC AUTO-ENTMCNC: 34.6 G/DL
MCV RBC AUTO: 89 FL
MONOCYTES # BLD AUTO: 0.6 K/UL
MONOCYTES NFR BLD: 7.7 %
NEUTROPHILS # BLD AUTO: 5.8 K/UL
NEUTROPHILS NFR BLD: 69.7 %
PLATELET # BLD AUTO: 280 K/UL
PMV BLD AUTO: 9.8 FL
POTASSIUM SERPL-SCNC: 3.1 MMOL/L
RBC # BLD AUTO: 3.53 M/UL
RETICS/RBC NFR AUTO: 1.4 %
SATURATED IRON: 24 %
SODIUM SERPL-SCNC: 141 MMOL/L
TOTAL IRON BINDING CAPACITY: 206 UG/DL
TRANSFERRIN SERPL-MCNC: 139 MG/DL
VIT B12 SERPL-MCNC: 386 PG/ML
WBC # BLD AUTO: 8.32 K/UL

## 2018-12-24 PROCEDURE — 82728 ASSAY OF FERRITIN: CPT

## 2018-12-24 PROCEDURE — G8996 SWALLOW CURRENT STATUS: HCPCS | Mod: CI

## 2018-12-24 PROCEDURE — 82746 ASSAY OF FOLIC ACID SERUM: CPT

## 2018-12-24 PROCEDURE — 25000003 PHARM REV CODE 250: Performed by: HOSPITALIST

## 2018-12-24 PROCEDURE — 83540 ASSAY OF IRON: CPT

## 2018-12-24 PROCEDURE — 25000003 PHARM REV CODE 250: Performed by: INTERNAL MEDICINE

## 2018-12-24 PROCEDURE — 93010 EKG 12-LEAD: ICD-10-PCS | Mod: ,,, | Performed by: INTERNAL MEDICINE

## 2018-12-24 PROCEDURE — 85045 AUTOMATED RETICULOCYTE COUNT: CPT

## 2018-12-24 PROCEDURE — G8997 SWALLOW GOAL STATUS: HCPCS | Mod: CI

## 2018-12-24 PROCEDURE — 80048 BASIC METABOLIC PNL TOTAL CA: CPT

## 2018-12-24 PROCEDURE — 63600175 PHARM REV CODE 636 W HCPCS: Performed by: EMERGENCY MEDICINE

## 2018-12-24 PROCEDURE — 63600175 PHARM REV CODE 636 W HCPCS: Performed by: HOSPITALIST

## 2018-12-24 PROCEDURE — 11000001 HC ACUTE MED/SURG PRIVATE ROOM

## 2018-12-24 PROCEDURE — 93010 ELECTROCARDIOGRAM REPORT: CPT | Mod: ,,, | Performed by: INTERNAL MEDICINE

## 2018-12-24 PROCEDURE — 82607 VITAMIN B-12: CPT

## 2018-12-24 PROCEDURE — 93005 ELECTROCARDIOGRAM TRACING: CPT

## 2018-12-24 PROCEDURE — 83735 ASSAY OF MAGNESIUM: CPT

## 2018-12-24 PROCEDURE — 85025 COMPLETE CBC W/AUTO DIFF WBC: CPT

## 2018-12-24 PROCEDURE — 86580 TB INTRADERMAL TEST: CPT | Performed by: HOSPITALIST

## 2018-12-24 PROCEDURE — 92610 EVALUATE SWALLOWING FUNCTION: CPT

## 2018-12-24 PROCEDURE — G8998 SWALLOW D/C STATUS: HCPCS | Mod: CH

## 2018-12-24 RX ORDER — DEXTROSE MONOHYDRATE, SODIUM CHLORIDE, AND POTASSIUM CHLORIDE 50; 2.98; 4.5 G/1000ML; G/1000ML; G/1000ML
INJECTION, SOLUTION INTRAVENOUS CONTINUOUS
Status: DISCONTINUED | OUTPATIENT
Start: 2018-12-24 | End: 2018-12-25

## 2018-12-24 RX ORDER — AMOXICILLIN AND CLAVULANATE POTASSIUM 875; 125 MG/1; MG/1
1 TABLET, FILM COATED ORAL EVERY 12 HOURS
Status: COMPLETED | OUTPATIENT
Start: 2018-12-24 | End: 2018-12-28

## 2018-12-24 RX ORDER — OLANZAPINE 10 MG/2ML
5 INJECTION, POWDER, FOR SOLUTION INTRAMUSCULAR ONCE AS NEEDED
Status: COMPLETED | OUTPATIENT
Start: 2018-12-24 | End: 2018-12-25

## 2018-12-24 RX ORDER — POTASSIUM CHLORIDE 20 MEQ/15ML
40 SOLUTION ORAL EVERY 4 HOURS
Status: COMPLETED | OUTPATIENT
Start: 2018-12-24 | End: 2018-12-24

## 2018-12-24 RX ORDER — BISACODYL 10 MG
10 SUPPOSITORY, RECTAL RECTAL DAILY PRN
Status: DISCONTINUED | OUTPATIENT
Start: 2018-12-24 | End: 2018-12-28 | Stop reason: HOSPADM

## 2018-12-24 RX ORDER — AMOXICILLIN 250 MG
1 CAPSULE ORAL 2 TIMES DAILY
Status: DISCONTINUED | OUTPATIENT
Start: 2018-12-24 | End: 2018-12-28 | Stop reason: HOSPADM

## 2018-12-24 RX ORDER — ACETAMINOPHEN 325 MG/1
650 TABLET ORAL EVERY 6 HOURS PRN
Status: DISCONTINUED | OUTPATIENT
Start: 2018-12-24 | End: 2018-12-28 | Stop reason: HOSPADM

## 2018-12-24 RX ORDER — METOPROLOL TARTRATE 50 MG/1
50 TABLET ORAL 2 TIMES DAILY
Status: DISCONTINUED | OUTPATIENT
Start: 2018-12-24 | End: 2018-12-25

## 2018-12-24 RX ORDER — METOPROLOL TARTRATE 1 MG/ML
5 INJECTION, SOLUTION INTRAVENOUS EVERY 5 MIN PRN
Status: DISCONTINUED | OUTPATIENT
Start: 2018-12-24 | End: 2018-12-28 | Stop reason: HOSPADM

## 2018-12-24 RX ADMIN — CLONIDINE HYDROCHLORIDE 0.1 MG: 0.1 TABLET ORAL at 05:12

## 2018-12-24 RX ADMIN — Medication 10 MG: at 08:12

## 2018-12-24 RX ADMIN — MAGNESIUM SULFATE HEPTAHYDRATE 3 G: 500 INJECTION, SOLUTION INTRAMUSCULAR; INTRAVENOUS at 10:12

## 2018-12-24 RX ADMIN — POTASSIUM CHLORIDE 40 MEQ: 1.5 SOLUTION ORAL at 03:12

## 2018-12-24 RX ADMIN — METOPROLOL TARTRATE 50 MG: 50 TABLET ORAL at 10:12

## 2018-12-24 RX ADMIN — STANDARDIZED SENNA CONCENTRATE AND DOCUSATE SODIUM 1 TABLET: 8.6; 5 TABLET, FILM COATED ORAL at 10:12

## 2018-12-24 RX ADMIN — APIXABAN 5 MG: 5 TABLET, FILM COATED ORAL at 10:12

## 2018-12-24 RX ADMIN — DONEPEZIL HYDROCHLORIDE 5 MG: 5 TABLET, FILM COATED ORAL at 10:12

## 2018-12-24 RX ADMIN — SODIUM CHLORIDE AND POTASSIUM CHLORIDE: 4.5; 1.49 INJECTION, SOLUTION INTRAVENOUS at 12:12

## 2018-12-24 RX ADMIN — LOSARTAN POTASSIUM 100 MG: 25 TABLET, FILM COATED ORAL at 08:12

## 2018-12-24 RX ADMIN — METOPROLOL TARTRATE 5 MG: 1 INJECTION, SOLUTION INTRAVENOUS at 06:12

## 2018-12-24 RX ADMIN — CEFTRIAXONE 1 G: 1 INJECTION, SOLUTION INTRAVENOUS at 01:12

## 2018-12-24 RX ADMIN — POTASSIUM CHLORIDE 40 MEQ: 1.5 SOLUTION ORAL at 10:12

## 2018-12-24 RX ADMIN — DEXTROSE MONOHYDRATE, SODIUM CHLORIDE, AND POTASSIUM CHLORIDE: 50; 4.5; 2.98 INJECTION, SOLUTION INTRAVENOUS at 08:12

## 2018-12-24 RX ADMIN — ASPIRIN 81 MG: 81 TABLET, COATED ORAL at 08:12

## 2018-12-24 RX ADMIN — Medication 5 UNITS: at 03:12

## 2018-12-24 RX ADMIN — AMLODIPINE BESYLATE 10 MG: 5 TABLET ORAL at 08:12

## 2018-12-24 RX ADMIN — AMOXICILLIN AND CLAVULANATE POTASSIUM 1 TABLET: 875; 125 TABLET, FILM COATED ORAL at 10:12

## 2018-12-24 NOTE — PROGRESS NOTES
Called dtr Yamilex to inform my is confused and asking for family, restraints and AVAsys maintained

## 2018-12-24 NOTE — PT/OT/SLP PROGRESS
Missed Physical Therapy Evaluation      Patient Name:  Nik Falk   MRN:  [id number]    Patient not seen today secondary to Increased agitation. Patient currently in restraints.     Nurse Leticia notified.    Will follow-up with initial evaluation at later time/day.    James Adasm, PT   12/24/2018

## 2018-12-24 NOTE — NURSING
Patient screaming that her house is on fire, trying to get out of bed. Attempted to calm patient down. Patient screaming for daughter. Daughter, Yamilex called. Patient still screaming unable to calm down. Attempted to calm patient and re-orient patient for over 30 minutes. Patient un-directable.  Dr. Camarillo notified. New orders received.

## 2018-12-24 NOTE — ASSESSMENT & PLAN NOTE
Unclear etiology.  No previous workup appreciated in chart.  No obvious bleeding and H/H lower than previously documented 3 years ago.  TSAT 23%- not iron deficient. Retics 1.4- normal. Folate and B12 pending.   Stable, does not need transfusion

## 2018-12-24 NOTE — PLAN OF CARE
Problem: Adult Inpatient Plan of Care  Goal: Plan of Care Review  Outcome: Ongoing (interventions implemented as appropriate)  Pt confused and redirection given, pt turn Q2, AvaSys maintained, dtr at bedside assisting w/care, medicated x1 Clonidine 0.1mg, safety maintained, PPD placed for SNF placement,  IVF cont and IV abx per order, bed low locked and in position, will cont plan of care

## 2018-12-24 NOTE — ASSESSMENT & PLAN NOTE
Creatinine of 2.4 on admission.   She has renal insufficiency listed under her medical problems in Epic.  However, her previous creatinine values from 2015 were all 0.8.   She appeared volume depleted on exam.   She was started on IV fluids.  Retroperitoneal U/S showed no obstruction and possible medical kidney disease.  Renal function normalized with IVF alone.  Will wean down IVF and continue to monitor.

## 2018-12-24 NOTE — ASSESSMENT & PLAN NOTE
Suspect acute delirium from infection in addition to chronic dementia.   Head CT negative and non-focal exam.  Treat underlying infection and monitored.  Now back to baseline per family

## 2018-12-24 NOTE — PROGRESS NOTES
Notified MD: Dr. Breaux of patient HR; A-fib RVR  MD awaiting current vital signs    Gave vitals signs taken at 0603  Patient confused and uncooperative  Will update and continue to monitor patient

## 2018-12-24 NOTE — ASSESSMENT & PLAN NOTE
Patient presented with confusion suspected to be due to infectious encephalopathy from UTI. UA consistent with infections and UCx with presumptive E.coli.   Continue empiric on ceftriaxone and follow up cultures  UCX pan sensitive EColi  Change antibiotics to augmentin to complete course

## 2018-12-24 NOTE — ASSESSMENT & PLAN NOTE
Repleted with IVF and improving.  Her Mg level was normal at 1.8.  Will change IVF to 1/2NS with 20 KCL and monitor.

## 2018-12-24 NOTE — ASSESSMENT & PLAN NOTE
Patient presented with confusion suspected to be due to infectious encephalopathy from UTI. UA consistent with infections and UCx with presumptive E.coli.   Continue empiric on ceftriaxone and follow up cultures  De-escalate after obtaining sensitivities.

## 2018-12-24 NOTE — PLAN OF CARE
Problem: Adult Inpatient Plan of Care  Goal: Plan of Care Review  Outcome: Ongoing (interventions implemented as appropriate)  Patient remains free from injury and falls. Cooperative during shift, then became very confused and agitated. Patient climbing out of bed. Unable to calm or redirect patient. Nonviolent wrist restraints applied. IVF infusing. Weight shift assistance provided as needed. Plan of care continued.

## 2018-12-24 NOTE — SUBJECTIVE & OBJECTIVE
Interval History: Agitated this morning, now improved. She has no complaints.     Review of Systems   Unable to perform ROS: Dementia     Objective:     Vital Signs (Most Recent):  Temp: 97.7 °F (36.5 °C) (12/24/18 0652)  Pulse: 85 (12/24/18 0727)  Resp: (!) 26 (12/24/18 0652)  BP: (!) 189/97 (12/24/18 0726)  SpO2: 99 % (12/24/18 0654) Vital Signs (24h Range):  Temp:  [97.3 °F (36.3 °C)-98.2 °F (36.8 °C)] 97.7 °F (36.5 °C)  Pulse:  [] 85  Resp:  [17-26] 26  SpO2:  [95 %-100 %] 99 %  BP: (157-199)/(77-97) 189/97     Weight: 77.1 kg (170 lb)  Body mass index is 28.29 kg/m².    Intake/Output Summary (Last 24 hours) at 12/24/2018 1414  Last data filed at 12/24/2018 0833  Gross per 24 hour   Intake 1476 ml   Output --   Net 1476 ml      Physical Exam   Constitutional: She appears well-developed and well-nourished. No distress.   HENT:   Head: Normocephalic and atraumatic.   Nose: Nose normal.   Mouth/Throat: Oropharynx is clear and moist.   Cardiovascular: Normal rate, regular rhythm, normal heart sounds and intact distal pulses. Exam reveals no gallop and no friction rub.   No murmur heard.  Pulmonary/Chest: Effort normal and breath sounds normal. No stridor. No respiratory distress. She has no wheezes. She has no rales.   Abdominal: Soft. Bowel sounds are normal. She exhibits no distension and no mass. There is no tenderness. There is no guarding.   Musculoskeletal: She exhibits no edema.   Neurological: She is alert.   Oriented to person only. Recognizes family   Skin: Skin is warm and dry. She is not diaphoretic.   Nursing note and vitals reviewed.      Significant Labs: All pertinent labs within the past 24 hours have been reviewed.    Significant Imaging: I have reviewed and interpreted all pertinent imaging results/findings within the past 24 hours.

## 2018-12-24 NOTE — SUBJECTIVE & OBJECTIVE
Interval History: Daughter at the bedside report she is much improved, still more confused than her baseline but close to her normal which is orientation to self only but more energetic.    Review of Systems   Unable to perform ROS: Dementia     Objective:     Vital Signs (Most Recent):  Temp: 97.3 °F (36.3 °C) (12/23/18 1959)  Pulse: 64 (12/23/18 1959)  Resp: 18 (12/23/18 1959)  BP: (!) 163/77 (12/23/18 1959)  SpO2: 100 % (12/23/18 1959) Vital Signs (24h Range):  Temp:  [97.3 °F (36.3 °C)-98.7 °F (37.1 °C)] 97.3 °F (36.3 °C)  Pulse:  [64-92] 64  Resp:  [17-18] 18  SpO2:  [99 %-100 %] 100 %  BP: (134-209)/(63-85) 163/77     Weight: 77.1 kg (170 lb)  Body mass index is 28.29 kg/m².    Intake/Output Summary (Last 24 hours) at 12/23/2018 2138  Last data filed at 12/23/2018 1848  Gross per 24 hour   Intake 1286 ml   Output --   Net 1286 ml      Physical Exam   Constitutional: She appears well-developed and well-nourished. No distress.   HENT:   Head: Normocephalic.   Right Ear: External ear normal.   Left Ear: External ear normal.   Nose: Nose normal.   Eyes: EOM are normal. Pupils are equal, round, and reactive to light. No scleral icterus.   Neck: Normal range of motion. Neck supple.   Cardiovascular: Normal heart sounds. Exam reveals no friction rub.   No murmur heard.  Occasional irregularity. Good radial pulses.   Pulmonary/Chest: Effort normal and breath sounds normal. No respiratory distress. She has no wheezes. She has no rales.   Abdominal: Soft. Bowel sounds are normal. She exhibits no mass. There is no tenderness.   No hepatosplenomegaly   Musculoskeletal: Normal range of motion.   Neurological: She is alert. No cranial nerve deficit.   Oriented to self, not oriented to place or year but able to answer simple questions   Skin: Skin is warm and dry. Capillary refill takes less than 2 seconds.       Significant Labs: All pertinent labs within the past 24 hours have been reviewed.    Significant Imaging: I have  reviewed and interpreted all pertinent imaging results/findings within the past 24 hours.

## 2018-12-24 NOTE — PROGRESS NOTES
[facility] Hospital Medicine  Progress Note    Patient Name: Nik Falk  MRN: [id number]  Patient Class: IP- Inpatient   Admission Date: 2018  Length of Stay: 2 days  Attending Physician: Joann Breaux MD  Primary Care Provider: Ronen Leong MD        Subjective:     Principal Problem:Acute cystitis without hematuria    HPI:  Ms. Falk is an 86 yo woman with advanced dementia, h/o stroke, essential hypertension, and questionable h/o CKD who presented to the ER with her daughter for confusion and falls. History is limited due to patient's dementia. Attempts to contact her daughter Yamilex x2 unsuccessful. Per the ER physician, the daughter reports that her mom normally ambulates adequately with a cane at home but has fallen twice in the past two days. The patient has reprotedly not been eating as much as usual. She didn't seem to recognize her daughter and was confused this morning which prompted the patient's daughter to bring her to the ER.  The patient denies current fevers, chills, abdominal discomfort, pelvic discomfort, chest pain. She does report knee pain. In the ER the patient was confused believing that she was at home but was oriented to her name and . Could not tell me the year. Was able to tell me her daughter's name is Yamilex.  Lab evaluation revealed likely pre-renal ARF as well as findings consistent with acute bacterial cystitis on UA. The patient was admitted to hospital medicine for further care.    Hospital Course:  Ms. Falk was admitted with AMS due to acute cystitis and ARF. She does have underlying dementia at baseline. Head CT and C-spine was overall unremarkable for any acute abnormalities. CXR was negative for infection. Pt was noted to have UA consistent with infection and had increase in BUN/creatinine of 30/2.4 and hypokalemia. Pt was started on empiric Rocephin and urine culture showed GNR. Blood cultures were not done on admission. She was also  treated with IVF and had close monitoring of renal function, and supplementation of her potassium. She became agitated overnight on 12/24. Per her family, this is baseline- she wanders out of the house and down the street at times at night. She went into Afib RVR on 12/24 that resolved with IV metoprolol x1. Discussed with family, started metoprolol and eliquis. Family is requesting nursing home placement.     Interval History: Agitated this morning, now improved. She has no complaints.     Review of Systems   Unable to perform ROS: Dementia     Objective:     Vital Signs (Most Recent):  Temp: 97.7 °F (36.5 °C) (12/24/18 0652)  Pulse: 85 (12/24/18 0727)  Resp: (!) 26 (12/24/18 0652)  BP: (!) 189/97 (12/24/18 0726)  SpO2: 99 % (12/24/18 0654) Vital Signs (24h Range):  Temp:  [97.3 °F (36.3 °C)-98.2 °F (36.8 °C)] 97.7 °F (36.5 °C)  Pulse:  [] 85  Resp:  [17-26] 26  SpO2:  [95 %-100 %] 99 %  BP: (157-199)/(77-97) 189/97     Weight: 77.1 kg (170 lb)  Body mass index is 28.29 kg/m².    Intake/Output Summary (Last 24 hours) at 12/24/2018 1414  Last data filed at 12/24/2018 0833  Gross per 24 hour   Intake 1476 ml   Output --   Net 1476 ml      Physical Exam   Constitutional: She appears well-developed and well-nourished. No distress.   HENT:   Head: Normocephalic and atraumatic.   Nose: Nose normal.   Mouth/Throat: Oropharynx is clear and moist.   Cardiovascular: Normal rate, regular rhythm, normal heart sounds and intact distal pulses. Exam reveals no gallop and no friction rub.   No murmur heard.  Pulmonary/Chest: Effort normal and breath sounds normal. No stridor. No respiratory distress. She has no wheezes. She has no rales.   Abdominal: Soft. Bowel sounds are normal. She exhibits no distension and no mass. There is no tenderness. There is no guarding.   Musculoskeletal: She exhibits no edema.   Neurological: She is alert.   Oriented to person only. Recognizes family   Skin: Skin is warm and dry. She is not  diaphoretic.   Nursing note and vitals reviewed.      Significant Labs: All pertinent labs within the past 24 hours have been reviewed.    Significant Imaging: I have reviewed and interpreted all pertinent imaging results/findings within the past 24 hours.    Assessment/Plan:      * Acute cystitis without hematuria    Patient presented with confusion suspected to be due to infectious encephalopathy from UTI. UA consistent with infections and UCx with presumptive E.coli.   Continue empiric on ceftriaxone and follow up cultures  UCX pan sensitive EColi  Change antibiotics to augmentin to complete course     Hypomagnesemia    Replace and monitor        Paroxysmal atrial fibrillation with RVR    Developed Afib RVR on 12/24 AM  Improved with metoprolol 5mg IV x1  Rate control- start metoprolol  Anticoagulation- start eliquis         Anemia    Unclear etiology.  No previous workup appreciated in chart.  No obvious bleeding and H/H lower than previously documented 3 years ago.  TSAT 23%- not iron deficient. Retics 1.4- normal. Folate and B12 pending.   Stable, does not need transfusion     Moderate protein-calorie malnutrition    Supplement and advance diet      Vascular dementia without behavioral disturbance    Suspected vascular dementia with past history of stroke.   Continue home Aricept.  With agitation at night- PRN zyprexa x1      Hypokalemia    Replete and monitor     Essential hypertension    Continue amlodipine and losartan.  Hypertensive on admit.   Add metoprolol for Afib RVR on 12/24     ARF (acute renal failure)    Creatinine of 2.4 on admission.   She has renal insufficiency listed under her medical problems in Epic.  However, her previous creatinine values from 2015 were all 0.8.   She appeared volume depleted on exam.   She was started on IV fluids.  Retroperitoneal U/S showed no obstruction and possible medical kidney disease.  Renal function normalized with IVF alone.  Will wean down IVF and continue to  monitor.      Infectious encephalopathy    Suspect acute delirium from infection in addition to chronic dementia.   Head CT negative and non-focal exam.  Treat underlying infection and monitored.  Now back to baseline per family       VTE Risk Mitigation (From admission, onward)        Ordered     apixaban tablet 5 mg  2 times daily      12/24/18 1412     IP VTE HIGH RISK PATIENT  Once      12/22/18 1329        Discussed with daughter Yamilex and son Evert- they both want nursing home placement. Will work to arrange. PPD ordered      Joann Breaux MD  Department of Hospital Medicine   [facility]

## 2018-12-24 NOTE — PT/OT/SLP PROGRESS
Occupational Therapy      Patient Name:  Nik Falk   MRN:  [id number]    Patient not seen today secondary to Increased agitation(per nursing patient agitated and restrained). Will follow-up as able.    MABEL Jolley MS  12/24/2018

## 2018-12-24 NOTE — PROGRESS NOTES
[facility] Hospital Medicine  Progress Note    Patient Name: Nik Falk  MRN: [id number]  Patient Class: IP- Inpatient   Admission Date: 2018  Length of Stay: 1 days  Attending Physician: Saba Em MD  Primary Care Provider: Ronen Leong MD        Subjective:     Principal Problem:Acute cystitis without hematuria    HPI:  Ms. Falk is an 88 yo woman with advanced dementia, h/o stroke, essential hypertension, and questionable h/o CKD who presented to the ER with her daughter for confusion and falls. History is limited due to patient's dementia. Attempts to contact her daughter Yamilex x2 unsuccessful. Per the ER physician, the daughter reports that her mom normally ambulates adequately with a cane at home but has fallen twice in the past two days. The patient has reprotedly not been eating as much as usual. She didn't seem to recognize her daughter and was confused this morning which prompted the patient's daughter to bring her to the ER.  The patient denies current fevers, chills, abdominal discomfort, pelvic discomfort, chest pain. She does report knee pain. In the ER the patient was confused believing that she was at home but was oriented to her name and . Could not tell me the year. Was able to tell me her daughter's name is Yamilex.  Lab evaluation revealed likely pre-renal ARF as well as findings consistent with acute bacterial cystitis on UA. The patient was admitted to hospital medicine for further care.    Hospital Course:  Ms. Falk was admitted with AMS due to acute cystitis and ARF. She does have underlying dementia at baseline. Head CT and C-spine was overall unremarkable for any acute abnormalities. CXR was negative for infection. Pt was noted to have UA consistent with infection and had increase in BUN/creatinine of 30/2.4 and hypokalemia. Pt was started on empiric Rocephin and urine culture showed GNR. Blood cultures were not done on admission. She was also  treated with IVF and had close monitoring of renal function, and supplementation of her potassium.    Interval History: Daughter at the bedside report she is much improved, still more confused than her baseline but close to her normal which is orientation to self only but more energetic.    Review of Systems   Unable to perform ROS: Dementia     Objective:     Vital Signs (Most Recent):  Temp: 97.3 °F (36.3 °C) (12/23/18 1959)  Pulse: 64 (12/23/18 1959)  Resp: 18 (12/23/18 1959)  BP: (!) 163/77 (12/23/18 1959)  SpO2: 100 % (12/23/18 1959) Vital Signs (24h Range):  Temp:  [97.3 °F (36.3 °C)-98.7 °F (37.1 °C)] 97.3 °F (36.3 °C)  Pulse:  [64-92] 64  Resp:  [17-18] 18  SpO2:  [99 %-100 %] 100 %  BP: (134-209)/(63-85) 163/77     Weight: 77.1 kg (170 lb)  Body mass index is 28.29 kg/m².    Intake/Output Summary (Last 24 hours) at 12/23/2018 2138  Last data filed at 12/23/2018 1848  Gross per 24 hour   Intake 1286 ml   Output --   Net 1286 ml      Physical Exam   Constitutional: She appears well-developed and well-nourished. No distress.   HENT:   Head: Normocephalic.   Right Ear: External ear normal.   Left Ear: External ear normal.   Nose: Nose normal.   Eyes: EOM are normal. Pupils are equal, round, and reactive to light. No scleral icterus.   Neck: Normal range of motion. Neck supple.   Cardiovascular: Normal heart sounds. Exam reveals no friction rub.   No murmur heard.  Occasional irregularity. Good radial pulses.   Pulmonary/Chest: Effort normal and breath sounds normal. No respiratory distress. She has no wheezes. She has no rales.   Abdominal: Soft. Bowel sounds are normal. She exhibits no mass. There is no tenderness.   No hepatosplenomegaly   Musculoskeletal: Normal range of motion.   Neurological: She is alert. No cranial nerve deficit.   Oriented to self, not oriented to place or year but able to answer simple questions   Skin: Skin is warm and dry. Capillary refill takes less than 2 seconds.       Significant  Labs: All pertinent labs within the past 24 hours have been reviewed.    Significant Imaging: I have reviewed and interpreted all pertinent imaging results/findings within the past 24 hours.    Assessment/Plan:      * Acute cystitis without hematuria    Patient presented with confusion suspected to be due to infectious encephalopathy from UTI. UA consistent with infections and UCx with presumptive E.coli.   Continue empiric on ceftriaxone and follow up cultures  De-escalate after obtaining sensitivities.     ARF (acute renal failure)    Creatinine of 2.4 on admission.   She has renal insufficiency listed under her medical problems in Epic.  However, her previous creatinine values from 2015 were all 0.8.   She appeared volume depleted on exam.   She was started on IV fluids.  Retroperitoneal U/S showed no obstruction and possible medical kidney disease.  Renal function normalized with IVF alone.  Will wean down IVF and continue to monitor.      Infectious encephalopathy    Suspect acute delirium from infection in addition to chronic dementia.   Head CT negative and non-focal exam.  Treat underlying infection and monitored.  Mental status almost back to baseline consistent with infectious.  Almost resolved.     Hypokalemia    Repleted with IVF and improving.  Her Mg level was normal at 1.8.  Will change IVF to 1/2NS with 20 KCL and monitor.     Anemia    Unclear etiology.  No previous workup appreciated in chart.  No obvious bleeding and H/H lower than previously documented 3 years ago.  Will get workup ordered for am blood draw and monitor.     Moderate protein-calorie malnutrition    Supplement and advance diet today since mentally appropriate.     Vascular dementia without behavioral disturbance    Suspected vascular dementia with past history of stroke.   Continue home Aricept.     Essential hypertension    Continue amlodipine and losartan.  Hypertensive on admit.   Clonidine PRN.       VTE Risk Mitigation (From  admission, onward)        Ordered     enoxaparin injection 30 mg  Daily      12/22/18 1329     IP VTE HIGH RISK PATIENT  Once      12/22/18 1329              Saba Em MD  Department of Hospital Medicine   [facility]

## 2018-12-24 NOTE — ASSESSMENT & PLAN NOTE
Developed Afib RVR on 12/24 AM  Improved with metoprolol 5mg IV x1  Rate control- start metoprolol  Anticoagulation- start eliquis

## 2018-12-24 NOTE — PLAN OF CARE
12/24/18 1305   Discharge Assessment   Assessment Type Discharge Planning Assessment   Assessment information obtained from? Medical Record   Prior to hospitilization cognitive status: Alert/Oriented   Prior to hospitalization functional status: Independent   Current cognitive status: Not Oriented to Place;Not Oriented to Time;Not Oriented to Person   Current Functional Status: Assistive Equipment;Needs Assistance   Lives With child(maureen), adult   Able to Return to Prior Arrangements yes   Is patient able to care for self after discharge? Yes   Who are your caregiver(s) and their phone number(s)? Yamilexe- [phone]   Readmission Within the Last 30 Days no previous admission in last 30 days   Patient currently being followed by outpatient case management? No   Equipment Currently Used at Home cane, straight;wheelchair   Do you have any problems affording any of your prescribed medications? No   Is the patient taking medications as prescribed? yes   Does the patient have transportation home? Yes   Transportation Anticipated family or friend will provide;car, drives self   Does the patient receive services at the [facility]? No   Discharge Plan A Home with family;Home Health   Discharge Plan B Home Health;Home with family  (with follow up appointments)   Patient/Family in Agreement with Plan yes

## 2018-12-24 NOTE — ASSESSMENT & PLAN NOTE
Suspect acute delirium from infection in addition to chronic dementia.   Head CT negative and non-focal exam.  Treat underlying infection and monitored.  Mental status almost back to baseline consistent with infectious.  Almost resolved.

## 2018-12-24 NOTE — PT/OT/SLP EVAL
Physical Therapy Evaluation    Patient Name:  Nik Falk   MRN:  [id number]    Recommendations:     Discharge Recommendations:  nursing facility, skilled   Discharge Equipment Recommendations: walker, rolling   Barriers to discharge: decreased safety awareness    Assessment:     Nik Falk is a 87 y.o. female admitted with a medical diagnosis of Acute cystitis without hematuria.  She presents with the following impairments/functional limitations:  weakness, gait instability, impaired cardiopulmonary response to activity, impaired endurance, impaired balance, decreased lower extremity function, impaired functional mobilty, impaired self care skills, decreased safety awareness, decreased coordination, impaired cognition, impaired coordination, confused, and min disoriented, but able to identify her name and , states she lives with her son, currently on restraints, min agitated before PT and was calmer after PT session today.    Rehab Prognosis: Good; patient would benefit from acute skilled PT services to address these deficits and reach maximum level of function.    Recent Surgery: * No surgery found *      Plan:     During this hospitalization, patient to be seen 5 x/week to address the identified rehab impairments via gait training, therapeutic activities, therapeutic exercises and progress toward the following goals:    · Plan of Care Expires:  19    Subjective     Chief Complaint: none stated, but seems impatient and wanting to sit up  Patient/Family Comments/goals: agreed to walk with PT  Pain/Comfort:  · Pain Rating 1: 0/10  · Pain Rating Post-Intervention 1: 0/10    Patients cultural, spiritual, Evangelical conflicts given the current situation:      Living Environment:  Pt is a poor/ fair historian, but accdg to her, she lives with her son in a [address] house with 3 steps at the entrance with rails on one side; also states she was walking with no AD before she came to the  hosp.  Prior to admission, patients level of function; see above.  Equipment used at home: cane, straight, wheelchair(pt states she did not use any AD to walk at home; pt seems like a poor historian).  DME owned (not currently used): single point cane and wheelchair.  Upon discharge, patient will have assistance from family.    Objective:     Communicated with JEFF Wright prior to session.  Patient found all lines intact, call button in reach, bed alarm on and RN notified oxygen, peripheral IV  upon PT entry to room.    General Precautions: Standard, fall(confused)   Orthopedic Precautions:N/A   Braces: N/A     Exams:  · Cognitive Exam:  Patient is oriented to Person  · Gross Motor Coordination:  WFL  · Postural Exam:  Patient presented with the following abnormalities:    · -       Rounded shoulders  · -       Forward head  · -       Kyphosis  · Skin Integrity/Edema:      · -       Skin integrity: Visible skin intact  · RLE ROM: WFL  · RLE Strength: not formally assessed as pt had difficulty following commands but able to raise and move legs volitionally  · LLE ROM: WFL  · LLE Strength: not formally assessed as pt had difficulty following commands but able to raise and move legs volitionally    Functional Mobility:  · Bed Mobility:     · Rolling Left:  minimum assistance  · Rolling Right: minimum assistance  · Scooting: minimum assistance  · Supine to Sit: moderate assistance  · Sit to Supine: moderate assistance  · Transfers:     · Sit to Stand:  moderate assistance with rolling walker  · Gait: 8 ft with a RW, min A, mod unsteady, with pt walking far from the RW with kyphotic posture, slow sequencing of gait  · Balance: SITTING: Fair; Standing: Static: Fair; Dynamic: Fair (-)      Therapeutic Activities and Exercises:   functional mob training as above; mobility and safety techniques educated to pt    AM-PAC 6 CLICK MOBILITY  Total Score:15     Patient left supine with all lines intact, call button in reach, bed  alarm on, restraints reapplied at end of session and JEFF Wright notified.    GOALS:   Multidisciplinary Problems     Physical Therapy Goals        Problem: Physical Therapy Goal    Goal Priority Disciplines Outcome Goal Variances Interventions   Physical Therapy Goal     PT, PT/OT Ongoing (interventions implemented as appropriate)     Description:  Goals to be met by: 19     Patient will increase functional independence with mobility by performin. Supine to sit with Contact Guard Assistance  2. Sit to stand transfer with Contact Guard Assistance  3. Bed to chair transfer with Contact Guard Assistance using Rolling Walker  4. Gait  x 150 feet with Contact Guard Assistance using Rolling Walker.   5. Lower extremity exercise program x20 reps per handout, with supervision                      History:     Past Medical History:   Diagnosis Date    Dementia     Hypertension     Renal disorder     Stroke        Past Surgical History:   Procedure Laterality Date    CHOLECYSTECTOMY-LAPAROSCOPIC N/A 2015    Performed by Cristian Escalera MD at [facility]       Clinical Decision Making:     History  Co-morbidities and personal factors that may impact the plan of care Examination  Body Structures and Functions, activity limitations and participation restrictions that may impact the plan of care Clinical Presentation   Decision Making/ Complexity Score   Co-morbidities:   [] Time since onset of injury / illness / exacerbation  [] Status of current condition  [x]Patient's cognitive status and safety concerns    [x] Multiple Medical Problems (see med hx)  Personal Factors:   [x] Patient's age  [] Prior Level of function   [] Patient's home situation (environment and family support)  [] Patient's level of motivation  [] Expected progression of patient      HISTORY:(criteria)    [] 90315 - no personal factors/history    [x] 28986 - has 1-2 personal factor/comorbidity     [] 04214 - has >3 personal  factor/comorbidity     Body Regions:  [] Objective examination findings  [] Head     []  Neck  [x] Trunk   [] Upper Extremity  [x] Lower Extremity    Body Systems:  [] For communication ability, affect, cognition, language, and learning style: the assessment of the ability to make needs known, consciousness, orientation (person, place, and time), expected emotional /behavioral responses, and learning preferences (eg, learning barriers, education  needs)  [x] For the neuromuscular system: a general assessment of gross coordinated movement (eg, balance, gait, locomotion, transfers, and transitions) and motor function  (motor control and motor learning)  [x] For the musculoskeletal system: the assessment of gross symmetry, gross range of motion, gross strength, height, and weight  [] For the integumentary system: the assessment of pliability(texture), presence of scar formation, skin color, and skin integrity  [x] For cardiovascular/pulmonary system: the assessment of heart rate, respiratory rate, blood pressure, and edema     Activity limitations:    [x] Patient's cognitive status and saf ety concerns          [] Status of current condition      [] Weight bearing restriction  [x] Cardiopulmunary Restriction    Participation Restrictions:   [] Goals and goal agreement with the patient     [x] Rehab potential (prognosis) and probable outcome      Examination of Body System: (criteria)    [] 89118 - addressing 1-2 elements    [x] 53876 - addressing a total of 3 or more elements     [] 86925 -  Addressing a total of 4 or more elements         Clinical Presentation: (criteria)  Evolving - 62188     On examination of body system using standardized tests and measures patient presents with 3 or more elements from any of the following: body structures and functions, activity limitations, and/or participation restrictions.  Leading to a clinical presentation that is considered evolving with changing  characteristics                              Clinical Decision Making  (Eval Complexity):  Low- 91530     Time Tracking:     PT Received On: 12/24/18  PT Start Time: 1535     PT Stop Time: 1618  PT Total Time (min): 43 min     Billable Minutes: Evaluation 10, Gait Training 15 and Therapeutic Activity 18      Kathy Mccormick, PT  12/24/2018

## 2018-12-24 NOTE — PLAN OF CARE
Problem: Physical Therapy Goal  Goal: Physical Therapy Goal  Goals to be met by: 19     Patient will increase functional independence with mobility by performin. Supine to sit with Contact Guard Assistance  2. Sit to stand transfer with Contact Guard Assistance  3. Bed to chair transfer with Contact Guard Assistance using Rolling Walker  4. Gait  x 150 feet with Contact Guard Assistance using Rolling Walker.   5. Lower extremity exercise program x20 reps per handout, with supervision    Outcome: Ongoing (interventions implemented as appropriate)  PT for functional mob training and ex

## 2018-12-24 NOTE — ASSESSMENT & PLAN NOTE
Suspected vascular dementia with past history of stroke.   Continue home Aricept.  With agitation at night- PRN zyprexa x1

## 2018-12-24 NOTE — PLAN OF CARE
Problem: SLP Goal  Goal: SLP Goal  Pt will tolerate soft solids with thin liquids without overt s/s of aspiration -goal met 12/24  ST RECS: continue current diet, no further ST is warranted.

## 2018-12-25 LAB
ANION GAP SERPL CALC-SCNC: 7 MMOL/L
AORTIC ROOT ANNULUS: 3.09 CM
AORTIC VALVE CUSP SEPERATION: 1.95 CM
ASCENDING AORTA: 2.98 CM
AV INDEX (PROSTH): 0.68
AV MEAN GRADIENT: 5.64 MMHG
AV PEAK GRADIENT: 8.88 MMHG
AV VALVE AREA: 2.66 CM2
BASOPHILS # BLD AUTO: 0.01 K/UL
BASOPHILS NFR BLD: 0.1 %
BSA FOR ECHO PROCEDURE: 1.88 M2
BUN SERPL-MCNC: 7 MG/DL
CALCIUM SERPL-MCNC: 7.6 MG/DL
CHLORIDE SERPL-SCNC: 112 MMOL/L
CO2 SERPL-SCNC: 19 MMOL/L
CREAT SERPL-MCNC: 0.6 MG/DL
CV ECHO LV RWT: 0.91 CM
DIFFERENTIAL METHOD: ABNORMAL
DOP CALC AO PEAK VEL: 1.49 M/S
DOP CALC AO VTI: 34.5 CM
DOP CALC LVOT AREA: 3.94 CM2
DOP CALC LVOT DIAMETER: 2.24 CM
DOP CALC LVOT STROKE VOLUME: 91.85 CM3
DOP CALCLVOT PEAK VEL VTI: 23.32 CM
E WAVE DECELERATION TIME: 189.29 MSEC
E/A RATIO: 1.2
ECHO LV POSTERIOR WALL: 1.52 CM (ref 0.6–1.1)
EOSINOPHIL # BLD AUTO: 0.2 K/UL
EOSINOPHIL NFR BLD: 3.4 %
ERYTHROCYTE [DISTWIDTH] IN BLOOD BY AUTOMATED COUNT: 15.1 %
EST. GFR  (AFRICAN AMERICAN): >60 ML/MIN/1.73 M^2
EST. GFR  (NON AFRICAN AMERICAN): >60 ML/MIN/1.73 M^2
FRACTIONAL SHORTENING: 21 % (ref 28–44)
GLUCOSE SERPL-MCNC: 110 MG/DL
HCT VFR BLD AUTO: 27.7 %
HGB BLD-MCNC: 9.1 G/DL
INTERVENTRICULAR SEPTUM: 1.6 CM (ref 0.6–1.1)
IVRT: 0.08 MSEC
LA MAJOR: 5.33 CM
LA MINOR: 3.89 CM
LA WIDTH: 3.31 CM
LEFT ATRIUM SIZE: 4.49 CM
LEFT ATRIUM VOLUME INDEX: 30.8 ML/M2
LEFT ATRIUM VOLUME: 56.82 CM3
LEFT INTERNAL DIMENSION IN SYSTOLE: 2.63 CM (ref 2.1–4)
LEFT VENTRICLE DIASTOLIC VOLUME INDEX: 24.41 ML/M2
LEFT VENTRICLE DIASTOLIC VOLUME: 45.06 ML
LEFT VENTRICLE MASS INDEX: 104.6 G/M2
LEFT VENTRICLE SYSTOLIC VOLUME INDEX: 13.8 ML/M2
LEFT VENTRICLE SYSTOLIC VOLUME: 25.41 ML
LEFT VENTRICULAR INTERNAL DIMENSION IN DIASTOLE: 3.33 CM (ref 3.5–6)
LEFT VENTRICULAR MASS: 193.13 G
LYMPHOCYTES # BLD AUTO: 1.5 K/UL
LYMPHOCYTES NFR BLD: 21.5 %
MAGNESIUM SERPL-MCNC: 2 MG/DL
MCH RBC QN AUTO: 30.2 PG
MCHC RBC AUTO-ENTMCNC: 32.9 G/DL
MCV RBC AUTO: 92 FL
MONOCYTES # BLD AUTO: 0.7 K/UL
MONOCYTES NFR BLD: 10.1 %
MV PEAK A VEL: 1.14 M/S
MV PEAK E VEL: 1.37 M/S
NEUTROPHILS # BLD AUTO: 4.3 K/UL
NEUTROPHILS NFR BLD: 64.3 %
PISA TR MAX VEL: 3.32 M/S
PLATELET # BLD AUTO: 259 K/UL
PMV BLD AUTO: 9.9 FL
POTASSIUM SERPL-SCNC: 4.1 MMOL/L
PULM VEIN S/D RATIO: 1.61
PV PEAK D VEL: 0.41 M/S
PV PEAK S VEL: 0.66 M/S
PV PEAK VELOCITY: 1.03 CM/S
RA MAJOR: 5.25 CM
RA PRESSURE: 3 MMHG
RA WIDTH: 3.6 CM
RBC # BLD AUTO: 3.01 M/UL
RIGHT VENTRICULAR END-DIASTOLIC DIMENSION: 3.11 CM
RV TISSUE DOPPLER FREE WALL SYSTOLIC VELOCITY 1 (APICAL 4 CHAMBER VIEW): 13.94 M/S
SINUS: 3.27 CM
SODIUM SERPL-SCNC: 138 MMOL/L
STJ: 2.75 CM
TR MAX PG: 44.09 MMHG
TRICUSPID ANNULAR PLANE SYSTOLIC EXCURSION: 2.77 CM
TV REST PULMONARY ARTERY PRESSURE: 47 MMHG
WBC # BLD AUTO: 6.74 K/UL

## 2018-12-25 PROCEDURE — 36415 COLL VENOUS BLD VENIPUNCTURE: CPT

## 2018-12-25 PROCEDURE — S0166 INJ OLANZAPINE 2.5MG: HCPCS | Performed by: HOSPITALIST

## 2018-12-25 PROCEDURE — 25000003 PHARM REV CODE 250: Performed by: HOSPITALIST

## 2018-12-25 PROCEDURE — 85025 COMPLETE CBC W/AUTO DIFF WBC: CPT

## 2018-12-25 PROCEDURE — 83735 ASSAY OF MAGNESIUM: CPT

## 2018-12-25 PROCEDURE — 25000003 PHARM REV CODE 250: Performed by: INTERNAL MEDICINE

## 2018-12-25 PROCEDURE — 94761 N-INVAS EAR/PLS OXIMETRY MLT: CPT

## 2018-12-25 PROCEDURE — 11000001 HC ACUTE MED/SURG PRIVATE ROOM

## 2018-12-25 PROCEDURE — 80048 BASIC METABOLIC PNL TOTAL CA: CPT

## 2018-12-25 RX ORDER — CARVEDILOL 6.25 MG/1
25 TABLET ORAL 2 TIMES DAILY
Status: DISCONTINUED | OUTPATIENT
Start: 2018-12-25 | End: 2018-12-27

## 2018-12-25 RX ORDER — HYDROCHLOROTHIAZIDE 25 MG/1
25 TABLET ORAL DAILY
Status: DISCONTINUED | OUTPATIENT
Start: 2018-12-25 | End: 2018-12-28 | Stop reason: HOSPADM

## 2018-12-25 RX ADMIN — CARVEDILOL 25 MG: 6.25 TABLET, FILM COATED ORAL at 10:12

## 2018-12-25 RX ADMIN — ASPIRIN 81 MG: 81 TABLET, COATED ORAL at 08:12

## 2018-12-25 RX ADMIN — APIXABAN 5 MG: 5 TABLET, FILM COATED ORAL at 10:12

## 2018-12-25 RX ADMIN — CLONIDINE HYDROCHLORIDE 0.1 MG: 0.1 TABLET ORAL at 12:12

## 2018-12-25 RX ADMIN — STANDARDIZED SENNA CONCENTRATE AND DOCUSATE SODIUM 1 TABLET: 8.6; 5 TABLET, FILM COATED ORAL at 10:12

## 2018-12-25 RX ADMIN — DEXTROSE MONOHYDRATE, SODIUM CHLORIDE, AND POTASSIUM CHLORIDE 1000 ML: 50; 4.5; 2.98 INJECTION, SOLUTION INTRAVENOUS at 05:12

## 2018-12-25 RX ADMIN — AMOXICILLIN AND CLAVULANATE POTASSIUM 1 TABLET: 875; 125 TABLET, FILM COATED ORAL at 10:12

## 2018-12-25 RX ADMIN — AMOXICILLIN AND CLAVULANATE POTASSIUM 1 TABLET: 875; 125 TABLET, FILM COATED ORAL at 08:12

## 2018-12-25 RX ADMIN — CARVEDILOL 25 MG: 6.25 TABLET, FILM COATED ORAL at 08:12

## 2018-12-25 RX ADMIN — APIXABAN 5 MG: 5 TABLET, FILM COATED ORAL at 08:12

## 2018-12-25 RX ADMIN — OLANZAPINE 5 MG: 10 INJECTION, POWDER, FOR SOLUTION INTRAMUSCULAR at 11:12

## 2018-12-25 RX ADMIN — STANDARDIZED SENNA CONCENTRATE AND DOCUSATE SODIUM 1 TABLET: 8.6; 5 TABLET, FILM COATED ORAL at 08:12

## 2018-12-25 RX ADMIN — LOSARTAN POTASSIUM 100 MG: 25 TABLET, FILM COATED ORAL at 08:12

## 2018-12-25 RX ADMIN — DONEPEZIL HYDROCHLORIDE 5 MG: 5 TABLET, FILM COATED ORAL at 10:12

## 2018-12-25 RX ADMIN — AMLODIPINE BESYLATE 10 MG: 5 TABLET ORAL at 08:12

## 2018-12-25 RX ADMIN — CLONIDINE HYDROCHLORIDE 0.1 MG: 0.1 TABLET ORAL at 11:12

## 2018-12-25 RX ADMIN — CLONIDINE HYDROCHLORIDE 0.1 MG: 0.1 TABLET ORAL at 06:12

## 2018-12-25 NOTE — ASSESSMENT & PLAN NOTE
Creatinine of 2.4 on admission.   She has renal insufficiency listed under her medical problems in Epic.  However, her previous creatinine values from 2015 were all 0.8.   She appeared volume depleted on exam.   She was started on IV fluids.  Retroperitoneal U/S showed no obstruction and possible medical kidney disease.  Renal function normalized with IVF alone.  DC IVF

## 2018-12-25 NOTE — ASSESSMENT & PLAN NOTE
Continue amlodipine and losartan.  Hypertensive on admit.   Still hypertensive- change metoprolol to coreg. Next rx would be diuretic

## 2018-12-25 NOTE — ASSESSMENT & PLAN NOTE
Suspected vascular dementia with past history of stroke.   Continue home Aricept.  With agitation at night- PRN zyprexa available if needed  Delirium precautions  Avoid restraints

## 2018-12-25 NOTE — PROGRESS NOTES
Pt restraints released and dtr now at bedside assisting w/care    6918 pt placed back in restraints r/t pulling at medical therapy

## 2018-12-25 NOTE — PLAN OF CARE
Problem: Adult Inpatient Plan of Care  Goal: Plan of Care Review  Outcome: Ongoing (interventions implemented as appropriate)   12/25/18 4061   Plan of Care Review   Plan of Care Reviewed With patient   Patient remains confused redirection given, pt turned q2, AvaSys remains in place for safety. Patient medicated with clonidine x1 during shift. IVF continued as ordered, bed in low locked position, bed alarm armed and audible, will continue with plan of care.

## 2018-12-25 NOTE — PLAN OF CARE
Problem: Adult Inpatient Plan of Care  Goal: Plan of Care Review  Outcome: Ongoing (interventions implemented as appropriate)  Pt confused and redirection given, pt turn Q2, AvaSys maintained, dtr at bedside assisting w/care, medicated x1 Clonidine 0.1mg, safety maintained, PPD 0 redness/induration, restraints maintained, trial given but pt cont w/confusion & interference w/medical care, IVF d/c'd and IV abx po, bed low locked and in position, will cont plan of care

## 2018-12-25 NOTE — ASSESSMENT & PLAN NOTE
Developed Afib RVR on 12/24 AM  Improved with metoprolol 5mg IV x1  Rate control- carvedilol   Anticoagulation- start eliquis  TTE has normal systolic and diastolic function

## 2018-12-25 NOTE — PROGRESS NOTES
[facility] Hospital Medicine  Progress Note    Patient Name: Nik Falk  MRN: [id number]  Patient Class: IP- Inpatient   Admission Date: 2018  Length of Stay: 3 days  Attending Physician: Joann Breaux MD  Primary Care Provider: Ronen Leong MD        Subjective:     Principal Problem:Acute cystitis without hematuria    HPI:  Ms. Falk is an 88 yo woman with advanced dementia, h/o stroke, essential hypertension, and questionable h/o CKD who presented to the ER with her daughter for confusion and falls. History is limited due to patient's dementia. Attempts to contact her daughter Yamilex x2 unsuccessful. Per the ER physician, the daughter reports that her mom normally ambulates adequately with a cane at home but has fallen twice in the past two days. The patient has reprotedly not been eating as much as usual. She didn't seem to recognize her daughter and was confused this morning which prompted the patient's daughter to bring her to the ER.  The patient denies current fevers, chills, abdominal discomfort, pelvic discomfort, chest pain. She does report knee pain. In the ER the patient was confused believing that she was at home but was oriented to her name and . Could not tell me the year. Was able to tell me her daughter's name is Yamilex.  Lab evaluation revealed likely pre-renal ARF as well as findings consistent with acute bacterial cystitis on UA. The patient was admitted to hospital medicine for further care.    Hospital Course:  Ms. Falk was admitted with AMS due to acute cystitis and ARF. She does have underlying dementia at baseline. Head CT and C-spine was overall unremarkable for any acute abnormalities. CXR was negative for infection. Pt was noted to have UA consistent with infection and had increase in BUN/creatinine of 30/2.4 and hypokalemia. Pt was started on empiric Rocephin and urine culture showed GNR. Blood cultures were not done on admission. She was also  treated with IVF and had close monitoring of renal function, and supplementation of her potassium. She became agitated overnight on 12/24. Per her family, this is baseline- she wanders out of the house and down the street at times at night. She went into Afib RVR on 12/24 that resolved with IV metoprolol x1. Discussed with family, started metoprolol and eliquis. TTE 12/24 with LVH, normal EF and diastolic function. Remains hypertensive- will change metoprolol to coreg. Family is requesting nursing home placement; PPD placed.     Interval History: No complaints. Disoriented.     Review of Systems   Unable to perform ROS: Dementia     Objective:     Vital Signs (Most Recent):  Temp: 98.1 °F (36.7 °C) (12/25/18 0725)  Pulse: 65 (12/25/18 0725)  Resp: 20 (12/25/18 0725)  BP: (!) 197/96 (12/25/18 0725)  SpO2: 95 % (12/25/18 0725) Vital Signs (24h Range):  Temp:  [97.5 °F (36.4 °C)-98.1 °F (36.7 °C)] 98.1 °F (36.7 °C)  Pulse:  [64-99] 65  Resp:  [18-20] 20  SpO2:  [95 %-100 %] 95 %  BP: (161-197)/() 197/96     Weight: 77.1 kg (170 lb)  Body mass index is 28.29 kg/m².    Intake/Output Summary (Last 24 hours) at 12/25/2018 0953  Last data filed at 12/25/2018 0821  Gross per 24 hour   Intake 2492 ml   Output --   Net 2492 ml      Physical Exam   Constitutional: She appears well-developed and well-nourished. No distress.   HENT:   Head: Normocephalic and atraumatic.   Nose: Nose normal.   Mouth/Throat: Oropharynx is clear and moist.   Cardiovascular: Normal rate, regular rhythm, normal heart sounds and intact distal pulses. Exam reveals no gallop and no friction rub.   No murmur heard.  Pulmonary/Chest: Effort normal and breath sounds normal. No stridor. No respiratory distress. She has no wheezes. She has no rales.   Abdominal: Soft. Bowel sounds are normal. She exhibits no distension and no mass. There is no tenderness. There is no guarding.   Musculoskeletal: She exhibits no edema.   Neurological: She is alert.    Oriented to person only.    Skin: Skin is warm and dry. She is not diaphoretic.   Nursing note and vitals reviewed.      Significant Labs: All pertinent labs within the past 24 hours have been reviewed.    Significant Imaging: I have reviewed and interpreted all pertinent imaging results/findings within the past 24 hours.    Assessment/Plan:      * Acute cystitis without hematuria    Patient presented with confusion suspected to be due to infectious encephalopathy from UTI. UA consistent with infections and UCx with presumptive E.coli.   Continue empiric on ceftriaxone and follow up cultures  UCX pan sensitive EColi  Change antibiotics to augmentin to complete course     Hypomagnesemia    Replace and monitor   Resolved      Paroxysmal atrial fibrillation with RVR    Developed Afib RVR on 12/24 AM  Improved with metoprolol 5mg IV x1  Rate control- carvedilol   Anticoagulation- start eliquis  TTE has normal systolic and diastolic function          Anemia    Unclear etiology.  No previous workup appreciated in chart.  No obvious bleeding and H/H lower than previously documented 3 years ago.  TSAT 23%- not iron deficient. Retics 1.4- normal. Folate and B12 are wnl.   Stable, does not need transfusion or further inpatient monitoring      Moderate protein-calorie malnutrition    Supplement and advance diet. Assistance with meals     Vascular dementia without behavioral disturbance    Suspected vascular dementia with past history of stroke.   Continue home Aricept.  With agitation at night- PRN zyprexa available if needed  Delirium precautions  Avoid restraints     Hypokalemia    Replete and monitor  Resolved      Essential hypertension    Continue amlodipine and losartan.  Hypertensive on admit.   Still hypertensive- change metoprolol to coreg. Next rx would be diuretic     ARF (acute renal failure)    Creatinine of 2.4 on admission.   She has renal insufficiency listed under her medical problems in Epic.  However, her  previous creatinine values from 2015 were all 0.8.   She appeared volume depleted on exam.   She was started on IV fluids.  Retroperitoneal U/S showed no obstruction and possible medical kidney disease.  Renal function normalized with IVF alone.  DC IVF     Infectious encephalopathy    Suspect acute delirium from infection in addition to chronic dementia.   Head CT negative and non-focal exam.  Treat underlying infection and monitored.  Now back to baseline per family       VTE Risk Mitigation (From admission, onward)        Ordered     apixaban tablet 5 mg  2 times daily      12/24/18 1412     IP VTE HIGH RISK PATIENT  Once      12/22/18 1329              Joann Breaux MD  Department of Hospital Medicine   [facility]

## 2018-12-25 NOTE — ASSESSMENT & PLAN NOTE
Unclear etiology.  No previous workup appreciated in chart.  No obvious bleeding and H/H lower than previously documented 3 years ago.  TSAT 23%- not iron deficient. Retics 1.4- normal. Folate and B12 are wnl.   Stable, does not need transfusion or further inpatient monitoring

## 2018-12-25 NOTE — SUBJECTIVE & OBJECTIVE
Interval History: No complaints. Disoriented.     Review of Systems   Unable to perform ROS: Dementia     Objective:     Vital Signs (Most Recent):  Temp: 98.1 °F (36.7 °C) (12/25/18 0725)  Pulse: 65 (12/25/18 0725)  Resp: 20 (12/25/18 0725)  BP: (!) 197/96 (12/25/18 0725)  SpO2: 95 % (12/25/18 0725) Vital Signs (24h Range):  Temp:  [97.5 °F (36.4 °C)-98.1 °F (36.7 °C)] 98.1 °F (36.7 °C)  Pulse:  [64-99] 65  Resp:  [18-20] 20  SpO2:  [95 %-100 %] 95 %  BP: (161-197)/() 197/96     Weight: 77.1 kg (170 lb)  Body mass index is 28.29 kg/m².    Intake/Output Summary (Last 24 hours) at 12/25/2018 0953  Last data filed at 12/25/2018 0821  Gross per 24 hour   Intake 2492 ml   Output --   Net 2492 ml      Physical Exam   Constitutional: She appears well-developed and well-nourished. No distress.   HENT:   Head: Normocephalic and atraumatic.   Nose: Nose normal.   Mouth/Throat: Oropharynx is clear and moist.   Cardiovascular: Normal rate, regular rhythm, normal heart sounds and intact distal pulses. Exam reveals no gallop and no friction rub.   No murmur heard.  Pulmonary/Chest: Effort normal and breath sounds normal. No stridor. No respiratory distress. She has no wheezes. She has no rales.   Abdominal: Soft. Bowel sounds are normal. She exhibits no distension and no mass. There is no tenderness. There is no guarding.   Musculoskeletal: She exhibits no edema.   Neurological: She is alert.   Oriented to person only.    Skin: Skin is warm and dry. She is not diaphoretic.   Nursing note and vitals reviewed.      Significant Labs: All pertinent labs within the past 24 hours have been reviewed.    Significant Imaging: I have reviewed and interpreted all pertinent imaging results/findings within the past 24 hours.

## 2018-12-26 PROCEDURE — 25000003 PHARM REV CODE 250: Performed by: HOSPITALIST

## 2018-12-26 PROCEDURE — 11000001 HC ACUTE MED/SURG PRIVATE ROOM

## 2018-12-26 PROCEDURE — 25000003 PHARM REV CODE 250: Performed by: INTERNAL MEDICINE

## 2018-12-26 RX ADMIN — CLONIDINE HYDROCHLORIDE 0.1 MG: 0.1 TABLET ORAL at 10:12

## 2018-12-26 RX ADMIN — AMOXICILLIN AND CLAVULANATE POTASSIUM 1 TABLET: 875; 125 TABLET, FILM COATED ORAL at 08:12

## 2018-12-26 RX ADMIN — APIXABAN 5 MG: 5 TABLET, FILM COATED ORAL at 08:12

## 2018-12-26 RX ADMIN — HYDROCHLOROTHIAZIDE 25 MG: 25 TABLET ORAL at 08:12

## 2018-12-26 RX ADMIN — CLONIDINE HYDROCHLORIDE 0.1 MG: 0.1 TABLET ORAL at 04:12

## 2018-12-26 RX ADMIN — STANDARDIZED SENNA CONCENTRATE AND DOCUSATE SODIUM 1 TABLET: 8.6; 5 TABLET, FILM COATED ORAL at 08:12

## 2018-12-26 RX ADMIN — DONEPEZIL HYDROCHLORIDE 5 MG: 5 TABLET, FILM COATED ORAL at 08:12

## 2018-12-26 RX ADMIN — CARVEDILOL 25 MG: 6.25 TABLET, FILM COATED ORAL at 08:12

## 2018-12-26 RX ADMIN — AMLODIPINE BESYLATE 10 MG: 5 TABLET ORAL at 08:12

## 2018-12-26 RX ADMIN — LOSARTAN POTASSIUM 100 MG: 25 TABLET, FILM COATED ORAL at 08:12

## 2018-12-26 RX ADMIN — ASPIRIN 81 MG: 81 TABLET, COATED ORAL at 08:12

## 2018-12-26 NOTE — PROGRESS NOTES
[facility] Hospital Medicine  Progress Note    Patient Name: Nik Falk  MRN: [id number]  Patient Class: IP- Inpatient   Admission Date: 2018  Length of Stay: 4 days  Attending Physician: Joann Breaux MD  Primary Care Provider: Ronen Leong MD        Subjective:     Principal Problem:Acute cystitis without hematuria    HPI:  Ms. Falk is an 86 yo woman with advanced dementia, h/o stroke, essential hypertension, and questionable h/o CKD who presented to the ER with her daughter for confusion and falls. History is limited due to patient's dementia. Attempts to contact her daughter Yamilex x2 unsuccessful. Per the ER physician, the daughter reports that her mom normally ambulates adequately with a cane at home but has fallen twice in the past two days. The patient has reprotedly not been eating as much as usual. She didn't seem to recognize her daughter and was confused this morning which prompted the patient's daughter to bring her to the ER.  The patient denies current fevers, chills, abdominal discomfort, pelvic discomfort, chest pain. She does report knee pain. In the ER the patient was confused believing that she was at home but was oriented to her name and . Could not tell me the year. Was able to tell me her daughter's name is Yamilex.  Lab evaluation revealed likely pre-renal ARF as well as findings consistent with acute bacterial cystitis on UA. The patient was admitted to hospital medicine for further care.    Hospital Course:  Ms. Falk was admitted with AMS due to acute cystitis and ARF. She does have underlying dementia at baseline. Head CT and C-spine was overall unremarkable for any acute abnormalities. CXR was negative for infection. Pt was noted to have UA consistent with infection and had increase in BUN/creatinine of 30/2.4 and hypokalemia. Pt was started on empiric Rocephin and urine culture showed GNR. Blood cultures were not done on admission. She was also  treated with IVF and had close monitoring of renal function, and supplementation of her potassium. She became agitated overnight on 12/24. Per her family, this is baseline- she wanders out of the house and down the street at times at night. She went into Afib RVR on 12/24 that resolved with IV metoprolol x1. Discussed with family, started metoprolol and eliquis. TTE 12/24 with LVH, normal EF and diastolic function. Remains hypertensive- will change metoprolol to coreg. Still hypertensive on max doses of amlodipine, coreg, HCTZ, and losartan. Start hydralazine. Family is requesting nursing home placement; PPD placed. PT, OT recommending SNF.     Interval History: No complaints. Disoriented.     Review of Systems   Unable to perform ROS: Dementia     Objective:     Vital Signs (Most Recent):  Temp: 97.6 °F (36.4 °C) (12/26/18 0714)  Pulse: 61 (12/26/18 1001)  Resp: 20 (12/26/18 1001)  BP: (!) 182/79 (12/26/18 1001)  SpO2: 100 % (12/26/18 1001) Vital Signs (24h Range):  Temp:  [96.3 °F (35.7 °C)-97.8 °F (36.6 °C)] 97.6 °F (36.4 °C)  Pulse:  [47-63] 61  Resp:  [12-20] 20  SpO2:  [97 %-100 %] 100 %  BP: (167-195)/(68-86) 182/79     Weight: 77.1 kg (170 lb)  Body mass index is 28.29 kg/m².    Intake/Output Summary (Last 24 hours) at 12/26/2018 1122  Last data filed at 12/25/2018 1720  Gross per 24 hour   Intake 240 ml   Output --   Net 240 ml      Physical Exam   Constitutional: She appears well-developed and well-nourished. No distress.   HENT:   Head: Normocephalic and atraumatic.   Nose: Nose normal.   Mouth/Throat: Oropharynx is clear and moist.   Cardiovascular: Normal rate, regular rhythm, normal heart sounds and intact distal pulses. Exam reveals no gallop and no friction rub.   No murmur heard.  Pulmonary/Chest: Effort normal and breath sounds normal. No stridor. No respiratory distress. She has no wheezes. She has no rales.   Abdominal: Soft. Bowel sounds are normal. She exhibits no distension and no mass. There  is no tenderness. There is no guarding.   Musculoskeletal: She exhibits no edema.   Neurological: She is alert.   Oriented to person only.    Skin: Skin is warm and dry. She is not diaphoretic.   Nursing note and vitals reviewed.      Significant Labs: All pertinent labs within the past 24 hours have been reviewed.    Significant Imaging: I have reviewed and interpreted all pertinent imaging results/findings within the past 24 hours.    Assessment/Plan:      * Acute cystitis without hematuria    Patient presented with confusion suspected to be due to infectious encephalopathy from UTI. UA consistent with infections and UCx with presumptive E.coli.   Continue empiric on ceftriaxone and follow up cultures  UCX pan sensitive EColi  Change antibiotics to augmentin to complete course     Hypomagnesemia    Replace and monitor   Resolved      Paroxysmal atrial fibrillation with RVR    Developed Afib RVR on 12/24 AM  Improved with metoprolol 5mg IV x1  Rate control- carvedilol   Anticoagulation- start eliquis  TTE has normal systolic and diastolic function          Anemia    Unclear etiology.  No previous workup appreciated in chart.  No obvious bleeding and H/H lower than previously documented 3 years ago.  TSAT 23%- not iron deficient. Retics 1.4- normal. Folate and B12 are wnl.   Stable, does not need transfusion or further inpatient monitoring      Moderate protein-calorie malnutrition    Supplement and advance diet. Assistance with meals     Vascular dementia without behavioral disturbance    Suspected vascular dementia with past history of stroke.   Continue home Aricept.  With agitation at night- PRN zyprexa available if needed  Delirium precautions  Avoid restraints     Hypokalemia    Replete and monitor  Resolved      Essential hypertension    Continue amlodipine and losartan.  Hypertensive on admit.   Still hypertensive- change metoprolol to coreg, added HCTZ. Will add hydralazine     ARF (acute renal failure)     Creatinine of 2.4 on admission.   She has renal insufficiency listed under her medical problems in Epic.  However, her previous creatinine values from 2015 were all 0.8.   She appeared volume depleted on exam.   She was started on IV fluids.  Retroperitoneal U/S showed no obstruction and possible medical kidney disease.  Renal function normalized with IVF alone.  DC IVF     Infectious encephalopathy    Suspect acute delirium from infection in addition to chronic dementia.   Head CT negative and non-focal exam.  Treat underlying infection and monitored.  Now back to baseline per family       VTE Risk Mitigation (From admission, onward)        Ordered     apixaban tablet 5 mg  2 times daily      12/24/18 1412     IP VTE HIGH RISK PATIENT  Once      12/22/18 1329              Joann Breaux MD  Department of Hospital Medicine   [facility]

## 2018-12-26 NOTE — PT/OT/SLP PROGRESS
Occupational Therapy      Patient Name:  Nik Falk   MRN:  [id number]    Patient not seen today secondary to Patient unwilling to participate. Will follow-up tomorrow morning.    MABEL Jolley MS  12/26/2018

## 2018-12-26 NOTE — SUBJECTIVE & OBJECTIVE
Interval History: No complaints. Disoriented.     Review of Systems   Unable to perform ROS: Dementia     Objective:     Vital Signs (Most Recent):  Temp: 97.6 °F (36.4 °C) (12/26/18 0714)  Pulse: 61 (12/26/18 1001)  Resp: 20 (12/26/18 1001)  BP: (!) 182/79 (12/26/18 1001)  SpO2: 100 % (12/26/18 1001) Vital Signs (24h Range):  Temp:  [96.3 °F (35.7 °C)-97.8 °F (36.6 °C)] 97.6 °F (36.4 °C)  Pulse:  [47-63] 61  Resp:  [12-20] 20  SpO2:  [97 %-100 %] 100 %  BP: (167-195)/(68-86) 182/79     Weight: 77.1 kg (170 lb)  Body mass index is 28.29 kg/m².    Intake/Output Summary (Last 24 hours) at 12/26/2018 1122  Last data filed at 12/25/2018 1720  Gross per 24 hour   Intake 240 ml   Output --   Net 240 ml      Physical Exam   Constitutional: She appears well-developed and well-nourished. No distress.   HENT:   Head: Normocephalic and atraumatic.   Nose: Nose normal.   Mouth/Throat: Oropharynx is clear and moist.   Cardiovascular: Normal rate, regular rhythm, normal heart sounds and intact distal pulses. Exam reveals no gallop and no friction rub.   No murmur heard.  Pulmonary/Chest: Effort normal and breath sounds normal. No stridor. No respiratory distress. She has no wheezes. She has no rales.   Abdominal: Soft. Bowel sounds are normal. She exhibits no distension and no mass. There is no tenderness. There is no guarding.   Musculoskeletal: She exhibits no edema.   Neurological: She is alert.   Oriented to person only.    Skin: Skin is warm and dry. She is not diaphoretic.   Nursing note and vitals reviewed.      Significant Labs: All pertinent labs within the past 24 hours have been reviewed.    Significant Imaging: I have reviewed and interpreted all pertinent imaging results/findings within the past 24 hours.

## 2018-12-26 NOTE — PLAN OF CARE
Problem: Adult Inpatient Plan of Care  Goal: Plan of Care Review  Outcome: Ongoing (interventions implemented as appropriate)  Patient remains free from fall or injury, pt turned q2, AvaSys remains in place for safety. All due meds administered as ordered. Patient medicated with clonidine x1 during shift. Bed in low locked position, bed alarm armed and audible, will continue with plan of care.

## 2018-12-26 NOTE — PLAN OF CARE
Problem: Adult Inpatient Plan of Care  Goal: Plan of Care Review  Outcome: Ongoing (interventions implemented as appropriate)  Pt free from falls, injury or any further trauma throughout shift. Pt turned q2hrs. Pt alert and oriented to self. No complaints of pain throughout shift. Daughter @ bedside. Monitoring blood pressure, PRN medications administered. Pt in no distress. Will cont to monitor.    12/26/18 1502   Plan of Care Review   Plan of Care Reviewed With patient

## 2018-12-26 NOTE — PT/OT/SLP PROGRESS
Physical Therapy      Patient Name:  Nik Falk   MRN:  [id number]    Patient not seen today secondary to Patient unwilling to participate(PT attempted to see pt; RN agreed with PT at this time but pt refused to work with PT; RN made aware). Will follow-up 12/27/18.    Kathy Mccormick, PT

## 2018-12-26 NOTE — ASSESSMENT & PLAN NOTE
Continue amlodipine and losartan.  Hypertensive on admit.   Still hypertensive- change metoprolol to coreg, added HCTZ. Will add hydralazine

## 2018-12-27 PROBLEM — E83.42 HYPOMAGNESEMIA: Status: RESOLVED | Noted: 2018-12-24 | Resolved: 2018-12-27

## 2018-12-27 LAB
ANION GAP SERPL CALC-SCNC: 8 MMOL/L
BUN SERPL-MCNC: 9 MG/DL
CALCIUM SERPL-MCNC: 8.7 MG/DL
CHLORIDE SERPL-SCNC: 109 MMOL/L
CO2 SERPL-SCNC: 23 MMOL/L
CREAT SERPL-MCNC: 0.7 MG/DL
EST. GFR  (AFRICAN AMERICAN): >60 ML/MIN/1.73 M^2
EST. GFR  (NON AFRICAN AMERICAN): >60 ML/MIN/1.73 M^2
GLUCOSE SERPL-MCNC: 99 MG/DL
POTASSIUM SERPL-SCNC: 3.8 MMOL/L
SODIUM SERPL-SCNC: 140 MMOL/L
TB INDURATION 48 - 72 HR READ: 0 MM

## 2018-12-27 PROCEDURE — 25000003 PHARM REV CODE 250: Performed by: HOSPITALIST

## 2018-12-27 PROCEDURE — G8989 SELF CARE D/C STATUS: HCPCS | Mod: CL

## 2018-12-27 PROCEDURE — 25000003 PHARM REV CODE 250: Performed by: INTERNAL MEDICINE

## 2018-12-27 PROCEDURE — 97110 THERAPEUTIC EXERCISES: CPT

## 2018-12-27 PROCEDURE — 80048 BASIC METABOLIC PNL TOTAL CA: CPT

## 2018-12-27 PROCEDURE — 97530 THERAPEUTIC ACTIVITIES: CPT

## 2018-12-27 PROCEDURE — 36415 COLL VENOUS BLD VENIPUNCTURE: CPT

## 2018-12-27 PROCEDURE — 97165 OT EVAL LOW COMPLEX 30 MIN: CPT

## 2018-12-27 PROCEDURE — G8987 SELF CARE CURRENT STATUS: HCPCS | Mod: CL

## 2018-12-27 PROCEDURE — 11000001 HC ACUTE MED/SURG PRIVATE ROOM

## 2018-12-27 PROCEDURE — G8988 SELF CARE GOAL STATUS: HCPCS | Mod: CK

## 2018-12-27 RX ORDER — LABETALOL 100 MG/1
300 TABLET, FILM COATED ORAL EVERY 12 HOURS
Status: DISCONTINUED | OUTPATIENT
Start: 2018-12-27 | End: 2018-12-28 | Stop reason: HOSPADM

## 2018-12-27 RX ORDER — HYDRALAZINE HYDROCHLORIDE 25 MG/1
25 TABLET, FILM COATED ORAL EVERY 8 HOURS PRN
Status: DISCONTINUED | OUTPATIENT
Start: 2018-12-27 | End: 2018-12-28 | Stop reason: HOSPADM

## 2018-12-27 RX ADMIN — LOSARTAN POTASSIUM 100 MG: 25 TABLET, FILM COATED ORAL at 08:12

## 2018-12-27 RX ADMIN — CARVEDILOL 25 MG: 6.25 TABLET, FILM COATED ORAL at 08:12

## 2018-12-27 RX ADMIN — APIXABAN 5 MG: 5 TABLET, FILM COATED ORAL at 09:12

## 2018-12-27 RX ADMIN — AMLODIPINE BESYLATE 10 MG: 5 TABLET ORAL at 08:12

## 2018-12-27 RX ADMIN — APIXABAN 5 MG: 5 TABLET, FILM COATED ORAL at 08:12

## 2018-12-27 RX ADMIN — STANDARDIZED SENNA CONCENTRATE AND DOCUSATE SODIUM 1 TABLET: 8.6; 5 TABLET, FILM COATED ORAL at 08:12

## 2018-12-27 RX ADMIN — HYDRALAZINE HYDROCHLORIDE 25 MG: 25 TABLET ORAL at 12:12

## 2018-12-27 RX ADMIN — STANDARDIZED SENNA CONCENTRATE AND DOCUSATE SODIUM 1 TABLET: 8.6; 5 TABLET, FILM COATED ORAL at 09:12

## 2018-12-27 RX ADMIN — AMOXICILLIN AND CLAVULANATE POTASSIUM 1 TABLET: 875; 125 TABLET, FILM COATED ORAL at 08:12

## 2018-12-27 RX ADMIN — HYDROCHLOROTHIAZIDE 25 MG: 25 TABLET ORAL at 08:12

## 2018-12-27 RX ADMIN — CLONIDINE HYDROCHLORIDE 0.1 MG: 0.1 TABLET ORAL at 05:12

## 2018-12-27 RX ADMIN — ASPIRIN 81 MG: 81 TABLET, COATED ORAL at 08:12

## 2018-12-27 RX ADMIN — HYDRALAZINE HYDROCHLORIDE 25 MG: 25 TABLET ORAL at 09:12

## 2018-12-27 RX ADMIN — AMOXICILLIN AND CLAVULANATE POTASSIUM 1 TABLET: 875; 125 TABLET, FILM COATED ORAL at 09:12

## 2018-12-27 RX ADMIN — DONEPEZIL HYDROCHLORIDE 5 MG: 5 TABLET, FILM COATED ORAL at 09:12

## 2018-12-27 NOTE — ASSESSMENT & PLAN NOTE
Suspected vascular dementia with past history of stroke.   Continue home Aricept.  Delirium precautions  Avoid restraints

## 2018-12-27 NOTE — PLAN OF CARE
Problem: Occupational Therapy Goal  Goal: Occupational Therapy Goal  Goals to be met by: 12/27/2018     Patient will increase functional independence with ADLs by performing:    UE Dressing with Contact Guard Assistance.  LE Dressing with Moderate Assistance.  Grooming while seated with Contact Guard Assistance.  Sitting at edge of bed x10 minutes with Supervision.  Stand pivot transfers with Moderate Assistance.  Toilet transfer to bedside commode with Moderate Assistance.  Upper extremity exercise program with assistance as needed.    Outcome: Ongoing (interventions implemented as appropriate)  Patient tolerated evaluation fair, fair participation.  Patient will benefit from skilled OT services to address the above mentioned goals. MABEL Jolley, MS

## 2018-12-27 NOTE — ASSESSMENT & PLAN NOTE
Developed Afib RVR on 12/24 AM  Improved with metoprolol 5mg IV x1  Rate control- changed to labetalol  Anticoagulation- start eliquis  TTE has normal systolic and diastolic function   DC telemetry

## 2018-12-27 NOTE — PROGRESS NOTES
Shirley from [facility] says patient's daughter will tour facility..Dee Tam RN, BSN, STN [facility]  12/27/2018

## 2018-12-27 NOTE — PROGRESS NOTES
TN sent NH referrals through [facility]..Dee Tam RN, BSN, STN [facility]  12/27/2018

## 2018-12-27 NOTE — PT/OT/SLP PROGRESS
"Physical Therapy Treatment    Patient Name:  Nik Falk   MRN:  [id number]    Recommendations:     Discharge Recommendations:  nursing facility, skilled   Discharge Equipment Recommendations: walker, rolling   Barriers to discharge: decreased safety awareness    Assessment:     Nik Falk is a 87 y.o. female admitted with a medical diagnosis of Acute cystitis without hematuria.  She presents with the following impairments/functional limitations:  weakness, impaired endurance, impaired self care skills, gait instability, impaired balance, decreased lower extremity function, decreased upper extremity function, decreased safety awareness, impaired cognition, impaired functional mobilty, decreased ROM, pain . Pt with confusion , required frequent VC's to re-direct pt for safety. Pt will benefit from further therapy in order to get back to [facility].      Rehab Prognosis: Fair; patient would benefit from acute skilled PT services to address these deficits and reach maximum level of function.    Recent Surgery: * No surgery found *      Plan:     During this hospitalization, patient to be seen 5 x/week to address the identified rehab impairments via gait training, therapeutic activities, therapeutic exercises and progress toward the following goals:    · Plan of Care Expires:  01/07/19    Subjective     Chief Complaint: " I can't afford to pay rent"   Patient/Family Comments/goals:  " where am I ?   Pain/Comfort:  · Location - Side 1: Bilateral  · Location 1: knee  · Pain Addressed 1: Pre-medicate for activity, Nurse notified      Objective:     Communicated with nurse Benson prior to session.  Patient found all lines intact and call button in reach bed alarm, telemetry  upon PT entry to room.     General Precautions: Standard, fall   Orthopedic Precautions:N/A   Braces: N/A     Functional Mobility:  · Bed Mobility:     · Rolling Right: stand by assistance  · Scooting: contact guard assistance  · Supine to " Sit: stand by assistance and contact guard assistance  · Sit to Supine: contact guard assistance  · Transfers:     · Sit to Stand: x 2 trials from bed moderate assistance with rolling walker. V/T cues for safety technique and walker management.  · Gait: pt ambulated ~ 3 side steps x 2 trials (EOB>HOB ),  RW mod A x 2 . Pt with decreased keren, decreased step length, B knees flexion , FWD head/trunk  posture. Pt with decreased safety awareness (abruptly sit down ). V/T cues for safety technique and walker management.            AM-PAC 6 CLICK MOBILITY  Turning over in bed (including adjusting bedclothes, sheets and blankets)?: 4  Sitting down on and standing up from a chair with arms (e.g., wheelchair, bedside commode, etc.): 3  Moving from lying on back to sitting on the side of the bed?: 4  Moving to and from a bed to a chair (including a wheelchair)?: 3  Need to walk in hospital room?: 2  Climbing 3-5 steps with a railing?: 2  Basic Mobility Total Score: 18       Therapeutic Activities and Exercises:   pt performed bed mobility, transfer and gait training as above.   Pt performed seated BLE x 10 reps x 2 : AP, LAQ, HS, hip flexion and pillow squeezes. V/T cues for safety technique and sequence.     Patient left HOB elevated with all lines intact, call button in reach, bed alarm on and nurse notified..    GOALS:   Multidisciplinary Problems     Physical Therapy Goals        Problem: Physical Therapy Goal    Goal Priority Disciplines Outcome Goal Variances Interventions   Physical Therapy Goal     PT, PT/OT Ongoing (interventions implemented as appropriate)     Description:  Goals to be met by: 19     Patient will increase functional independence with mobility by performin. Supine to sit with Contact Guard Assistance  2. Sit to stand transfer with Contact Guard Assistance  3. Bed to chair transfer with Contact Guard Assistance using Rolling Walker  4. Gait  x 150 feet with Contact Guard Assistance  using Rolling Walker.   5. Lower extremity exercise program x20 reps per handout, with supervision                      Time Tracking:     PT Received On: 12/27/18  PT Start Time: 1509     PT Stop Time: 1534  PT Total Time (min): 25 min     Billable Minutes: Therapeutic Activity 12 and Therapeutic Exercise 13 EVAL for OT    Treatment Type: Treatment  PT/PTA: PTA     PTA Visit Number: 1     Tram T Elma, PTA  12/27/2018

## 2018-12-27 NOTE — PROGRESS NOTES
Order for SNF vs NH placement. TN called Britney Sheikh CM with [phone].  Britney says if SNF will probbly need to go to MRU.  She said that patient can have pt with snf placement as well with her insurance..Dee Tam RN, BSN, [facility]  12/27/2018

## 2018-12-27 NOTE — ASSESSMENT & PLAN NOTE
Continue amlodipine and losartan.  Hypertensive on admit.   Remains hypertensive despite HCTZ, losartan, amlodipine. Change coreg to labetalol 300 BID. Hydralazine PRN

## 2018-12-27 NOTE — SUBJECTIVE & OBJECTIVE
Interval History: No complaints.     Review of Systems   Unable to perform ROS: Dementia     Objective:     Vital Signs (Most Recent):  Temp: 97.8 °F (36.6 °C) (12/27/18 0805)  Pulse: 74 (12/27/18 0805)  Resp: 18 (12/27/18 0805)  BP: (!) 184/79 (12/27/18 0805)  SpO2: 97 % (12/27/18 0805) Vital Signs (24h Range):  Temp:  [97.4 °F (36.3 °C)-97.8 °F (36.6 °C)] 97.8 °F (36.6 °C)  Pulse:  [55-74] 74  Resp:  [18-20] 18  SpO2:  [94 %-100 %] 97 %  BP: (149-184)/(63-79) 184/79     Weight: 77.1 kg (170 lb)  Body mass index is 28.29 kg/m².    Intake/Output Summary (Last 24 hours) at 12/27/2018 0941  Last data filed at 12/27/2018 0820  Gross per 24 hour   Intake 540 ml   Output --   Net 540 ml      Physical Exam   Constitutional: She appears well-developed and well-nourished. No distress.   HENT:   Head: Normocephalic and atraumatic.   Nose: Nose normal.   Mouth/Throat: Oropharynx is clear and moist.   Cardiovascular: Normal rate, regular rhythm, normal heart sounds and intact distal pulses. Exam reveals no gallop and no friction rub.   No murmur heard.  Pulmonary/Chest: Effort normal and breath sounds normal. No stridor. No respiratory distress. She has no wheezes. She has no rales.   Abdominal: Soft. Bowel sounds are normal. She exhibits no distension and no mass. There is no tenderness. There is no guarding.   Musculoskeletal: She exhibits no edema.   Neurological: She is alert.   Oriented to person and place. Knows her birth year   Skin: Skin is warm and dry. She is not diaphoretic.   Nursing note and vitals reviewed.      Significant Labs: All pertinent labs within the past 24 hours have been reviewed.    Significant Imaging: I have reviewed and interpreted all pertinent imaging results/findings within the past 24 hours.

## 2018-12-27 NOTE — PROGRESS NOTES
[facility] Hospital Medicine  Progress Note    Patient Name: Nik Falk  MRN: [id number]  Patient Class: IP- Inpatient   Admission Date: 2018  Length of Stay: 5 days  Attending Physician: Joann Breaux MD  Primary Care Provider: Ronen Leong MD        Subjective:     Principal Problem:Acute cystitis without hematuria    HPI:  Ms. Falk is an 86 yo woman with advanced dementia, h/o stroke, essential hypertension, and questionable h/o CKD who presented to the ER with her daughter for confusion and falls. History is limited due to patient's dementia. Attempts to contact her daughter Yamilex x2 unsuccessful. Per the ER physician, the daughter reports that her mom normally ambulates adequately with a cane at home but has fallen twice in the past two days. The patient has reprotedly not been eating as much as usual. She didn't seem to recognize her daughter and was confused this morning which prompted the patient's daughter to bring her to the ER.  The patient denies current fevers, chills, abdominal discomfort, pelvic discomfort, chest pain. She does report knee pain. In the ER the patient was confused believing that she was at home but was oriented to her name and . Could not tell me the year. Was able to tell me her daughter's name is Yamilex.  Lab evaluation revealed likely pre-renal ARF as well as findings consistent with acute bacterial cystitis on UA. The patient was admitted to hospital medicine for further care.    Hospital Course:  Ms. Falk was admitted with AMS due to acute cystitis and ARF. She does have underlying dementia at baseline. Head CT and C-spine was overall unremarkable for any acute abnormalities. CXR was negative for infection. Pt was noted to have UA consistent with infection and had increase in BUN/creatinine of 30/2.4 and hypokalemia. Pt was started on empiric Rocephin and urine culture showed GNR. Blood cultures were not done on admission. She was also  treated with IVF and had close monitoring of renal function, and supplementation of her potassium. She became agitated overnight on 12/24. Per her family, this is baseline- she wanders out of the house and down the street at times at night. She went into Afib RVR on 12/24 that resolved with IV metoprolol x1. Discussed with family, started metoprolol and eliquis. TTE 12/24 with LVH, normal EF and diastolic function. Remains hypertensive- will change metoprolol to coreg. Still hypertensive- adjusting BP meds. Family is requesting nursing home placement; PPD placed. PT, OT recommending SNF. Waiting placement.    Interval History: No complaints.     Review of Systems   Unable to perform ROS: Dementia     Objective:     Vital Signs (Most Recent):  Temp: 97.8 °F (36.6 °C) (12/27/18 0805)  Pulse: 74 (12/27/18 0805)  Resp: 18 (12/27/18 0805)  BP: (!) 184/79 (12/27/18 0805)  SpO2: 97 % (12/27/18 0805) Vital Signs (24h Range):  Temp:  [97.4 °F (36.3 °C)-97.8 °F (36.6 °C)] 97.8 °F (36.6 °C)  Pulse:  [55-74] 74  Resp:  [18-20] 18  SpO2:  [94 %-100 %] 97 %  BP: (149-184)/(63-79) 184/79     Weight: 77.1 kg (170 lb)  Body mass index is 28.29 kg/m².    Intake/Output Summary (Last 24 hours) at 12/27/2018 0941  Last data filed at 12/27/2018 0820  Gross per 24 hour   Intake 540 ml   Output --   Net 540 ml      Physical Exam   Constitutional: She appears well-developed and well-nourished. No distress.   HENT:   Head: Normocephalic and atraumatic.   Nose: Nose normal.   Mouth/Throat: Oropharynx is clear and moist.   Cardiovascular: Normal rate, regular rhythm, normal heart sounds and intact distal pulses. Exam reveals no gallop and no friction rub.   No murmur heard.  Pulmonary/Chest: Effort normal and breath sounds normal. No stridor. No respiratory distress. She has no wheezes. She has no rales.   Abdominal: Soft. Bowel sounds are normal. She exhibits no distension and no mass. There is no tenderness. There is no guarding.    Musculoskeletal: She exhibits no edema.   Neurological: She is alert.   Oriented to person and place. Knows her birth year   Skin: Skin is warm and dry. She is not diaphoretic.   Nursing note and vitals reviewed.      Significant Labs: All pertinent labs within the past 24 hours have been reviewed.    Significant Imaging: I have reviewed and interpreted all pertinent imaging results/findings within the past 24 hours.    Assessment/Plan:      * Acute cystitis without hematuria    Patient presented with confusion suspected to be due to infectious encephalopathy from UTI. UA consistent with infections and UCx with presumptive E.coli.   Continue empiric on ceftriaxone and follow up cultures  UCX pan sensitive EColi  Change antibiotics to augmentin to complete course     Paroxysmal atrial fibrillation with RVR    Developed Afib RVR on 12/24 AM  Improved with metoprolol 5mg IV x1  Rate control- changed to labetalol  Anticoagulation- start eliquis  TTE has normal systolic and diastolic function   DC telemetry       Anemia    Unclear etiology.  No previous workup appreciated in chart.  No obvious bleeding and H/H lower than previously documented 3 years ago.  TSAT 23%- not iron deficient. Retics 1.4- normal. Folate and B12 are wnl.   Stable, does not need transfusion or further inpatient monitoring      Moderate protein-calorie malnutrition    Supplement and advance diet. Assistance with meals     Vascular dementia without behavioral disturbance    Suspected vascular dementia with past history of stroke.   Continue home Aricept.  Delirium precautions  Avoid restraints     Hypokalemia    Replete and monitor  Resolved      Essential hypertension    Continue amlodipine and losartan.  Hypertensive on admit.   Remains hypertensive despite HCTZ, losartan, amlodipine. Change coreg to labetalol 300 BID. Hydralazine PRN     ARF (acute renal failure)    Creatinine of 2.4 on admission.   She has renal insufficiency listed under her  medical problems in Epic.  However, her previous creatinine values from 2015 were all 0.8.   She appeared volume depleted on exam.   She was started on IV fluids.  Retroperitoneal U/S showed no obstruction and possible medical kidney disease.  Renal function normalized with IVF alone.  DC IVF     Infectious encephalopathy    Suspect acute delirium from infection in addition to chronic dementia.   Head CT negative and non-focal exam.  Treat underlying infection and monitored.  Now back to baseline per family       VTE Risk Mitigation (From admission, onward)        Ordered     apixaban tablet 5 mg  2 times daily      12/24/18 1412     IP VTE HIGH RISK PATIENT  Once      12/22/18 1329              Joann Breaux MD  Department of Hospital Medicine   [facility]

## 2018-12-27 NOTE — PT/OT/SLP EVAL
"Occupational Therapy   Evaluation    Name: iNk Falk  MRN: [id number]  Admitting Diagnosis:  Acute cystitis without hematuria      Recommendations:     Discharge Recommendations: nursing facility, skilled  Discharge Equipment Recommendations:  walker, rolling(TBD)  Barriers to discharge:  none    History:     Occupational Profile:  Living Environment: lives with her son in a 1 story house with a 3 JARRETT with rails on 1 side  Previous level of function: patient stated independent  Equipment Used at Home:  cane, straight, wheelchair  Assistance upon Discharge: from her family    Past Medical History:   Diagnosis Date    Dementia     Hypertension     Renal disorder     Stroke        Past Surgical History:   Procedure Laterality Date    CHOLECYSTECTOMY-LAPAROSCOPIC N/A 9/25/2015    Performed by Cristian Escalera MD at [facility] OR       Subjective     Chief Complaint: "I can't afford to buy my dinner here."  Patient/Family Comments/goals: to go home    Pain/Comfort:  · Pain Rating 1: 0/10    Patients cultural, spiritual, Yazidi conflicts given the current situation: no    Objective:     Communicated with: nurse prior to session.  Patient found with: all lines intact and call button in reach and oxygen, peripheral IV upon OT entry to room.    General Precautions: Standard, fall   Orthopedic Precautions:N/A   Braces: N/A     Occupational Performance:    Bed Mobility:    · Patient completed Rolling/Turning to Left with  moderate assistance and with side rail  · Patient completed Rolling/Turning to Right with moderate assistance and with side rail  · Patient completed Scooting/Bridging with contact guard assistance  · Patient completed Supine to Sit with moderate assistance  · Patient completed Sit to Supine with moderate assistance    Functional Mobility/Transfers:  · Patient completed Sit <> Stand Transfer with maximal assistance and of 2 persons  with  rolling walker   · Functional Mobility: took 2-3 side " "steps left and right with maximum assistance x2 people    Activities of Daily Living:  · Feeding:  minimum assistance per CNA  · Upper Body Dressing: moderate assistance seated EOB  · Lower Body Dressing: maximal assistance bed level    Cognitive/Visual Perceptual:  Cognitive/Psychosocial Skills:     -       Oriented to: Person   -       Follows Commands/attention:Easily distracted and Follows one-step commands  -       Communication: clear/fluent  -       Memory: Impaired STM, Impaired LTM and Poor immediate recall  -       Safety awareness/insight to disability: impaired   -       Mood/Affect/Coping skills/emotional control: Appropriate to situation    Physical Exam:  Balance:    -       sit balance fair; standing balance poor plus  Postural examination/scapula alignment:    -       Rounded shoulders  -       Forward head  Skin integrity: Visible skin intact  Upper Extremity Range of Motion:     -       Right Upper Extremity: WFL  -       Left Upper Extremity: WFL  Upper Extremity Strength:    -       Right Upper Extremity: WFL  -       Left Upper Extremity: WFL    AMPAC 6 Click ADL:  AMPAC Total Score: 14    Treatment & Education:  Evaluation  Education:    Patient left supine with all lines intact, call button in reach and PTA present    Assessment:     Nik Falk is a 87 y.o. female with a medical diagnosis of Acute cystitis without hematuria.  She presents with the following performance deficits affecting function: impaired endurance, weakness, impaired self care skills, impaired functional mobilty, impaired balance, impaired cognition, decreased upper extremity function, decreased safety awareness.      Rehab Prognosis: Good; patient would benefit from acute skilled OT services to address these deficits and reach maximum level of function.         Clinical Decision Makin.  OT Low:  "Pt evaluation falls under low complexity for evaluation coding due to performance deficits noted in 1-3 areas " "as stated above and 0 co-morbities affecting current functional status. Data obtained from problem focused assessments. No modifications or assistance was required for completion of evaluation. Only brief occupational profile and history review completed."     Plan:     Patient to be seen 3 x/week to address the above listed problems via self-care/home management, therapeutic exercises  · Plan of Care Expires: 01/10/19  · Plan of Care Reviewed with: patient    This Plan of care has been discussed with the patient who was involved in its development and understands and is in agreement with the identified goals and treatment plan    GOALS:   Multidisciplinary Problems     Occupational Therapy Goals        Problem: Occupational Therapy Goal    Goal Priority Disciplines Outcome Interventions   Occupational Therapy Goal     OT, PT/OT Ongoing (interventions implemented as appropriate)    Description:  Goals to be met by: 12/27/2018     Patient will increase functional independence with ADLs by performing:    UE Dressing with Contact Guard Assistance.  LE Dressing with Moderate Assistance.  Grooming while seated with Contact Guard Assistance.  Sitting at edge of bed x10 minutes with Supervision.  Stand pivot transfers with Moderate Assistance.  Toilet transfer to bedside commode with Moderate Assistance.  Upper extremity exercise program with assistance as needed.                      Time Tracking:     OT Date of Treatment: 12/27/18  OT Start Time: 1509  OT Stop Time: 1525  OT Total Time (min): 16 min    Billable Minutes:Evaluation 16 minutes OT eval with PTA treatment    MABEL Jolley, MS  12/27/2018    "

## 2018-12-27 NOTE — PLAN OF CARE
Problem: Physical Therapy Goal  Goal: Physical Therapy Goal  Goals to be met by: 19     Patient will increase functional independence with mobility by performin. Supine to sit with Contact Guard Assistance  2. Sit to stand transfer with Contact Guard Assistance  3. Bed to chair transfer with Contact Guard Assistance using Rolling Walker  4. Gait  x 150 feet with Contact Guard Assistance using Rolling Walker.   5. Lower extremity exercise program x20 reps per handout, with supervision     Outcome: Ongoing (interventions implemented as appropriate)  Pt will benefit from further therapy in order to get back to PLOF.

## 2018-12-27 NOTE — PLAN OF CARE
Problem: Adult Inpatient Plan of Care  Goal: Plan of Care Review  Outcome: Ongoing (interventions implemented as appropriate)  Pt free from falls, injury or any further trauma throughout shift. Pt turned q2hrs. Pt alert and oriented to self. No complaints of pain throughout shift. Daughter @ bedside. Monitoring blood pressure, PRN medications administered. Pt more confused during shift, trying to get out of bed, easily redirected and reoriented. Pt in no distress. Will cont to monitor.    12/27/18 9667   Plan of Care Review   Plan of Care Reviewed With patient

## 2018-12-28 VITALS
SYSTOLIC BLOOD PRESSURE: 126 MMHG | TEMPERATURE: 99 F | HEIGHT: 65 IN | DIASTOLIC BLOOD PRESSURE: 60 MMHG | BODY MASS INDEX: 28.32 KG/M2 | OXYGEN SATURATION: 98 % | HEART RATE: 57 BPM | RESPIRATION RATE: 18 BRPM | WEIGHT: 170 LBS

## 2018-12-28 PROBLEM — N30.00 ACUTE CYSTITIS WITHOUT HEMATURIA: Status: RESOLVED | Noted: 2018-12-22 | Resolved: 2018-12-28

## 2018-12-28 PROBLEM — E87.6 HYPOKALEMIA: Status: RESOLVED | Noted: 2018-12-22 | Resolved: 2018-12-28

## 2018-12-28 PROBLEM — B99.9 INFECTIOUS ENCEPHALOPATHY: Status: RESOLVED | Noted: 2018-12-22 | Resolved: 2018-12-28

## 2018-12-28 PROBLEM — G93.49 INFECTIOUS ENCEPHALOPATHY: Status: RESOLVED | Noted: 2018-12-22 | Resolved: 2018-12-28

## 2018-12-28 PROCEDURE — 25000003 PHARM REV CODE 250: Performed by: HOSPITALIST

## 2018-12-28 PROCEDURE — 25000003 PHARM REV CODE 250: Performed by: INTERNAL MEDICINE

## 2018-12-28 PROCEDURE — 97110 THERAPEUTIC EXERCISES: CPT

## 2018-12-28 PROCEDURE — 97530 THERAPEUTIC ACTIVITIES: CPT

## 2018-12-28 RX ORDER — HYDRALAZINE HYDROCHLORIDE 25 MG/1
25 TABLET, FILM COATED ORAL EVERY 8 HOURS
Qty: 90 TABLET | Refills: 11
Start: 2018-12-28 | End: 2019-12-28

## 2018-12-28 RX ORDER — LABETALOL 300 MG/1
300 TABLET, FILM COATED ORAL EVERY 12 HOURS
Qty: 60 TABLET | Refills: 11
Start: 2018-12-28 | End: 2019-12-28

## 2018-12-28 RX ORDER — HYDROCHLOROTHIAZIDE 25 MG/1
25 TABLET ORAL DAILY
Qty: 30 TABLET | Refills: 11
Start: 2018-12-29 | End: 2019-12-29

## 2018-12-28 RX ADMIN — LABETALOL HYDROCHLORIDE 300 MG: 100 TABLET, FILM COATED ORAL at 09:12

## 2018-12-28 RX ADMIN — AMLODIPINE BESYLATE 10 MG: 5 TABLET ORAL at 09:12

## 2018-12-28 RX ADMIN — Medication 10 MG: at 11:12

## 2018-12-28 RX ADMIN — LOSARTAN POTASSIUM 100 MG: 25 TABLET, FILM COATED ORAL at 09:12

## 2018-12-28 RX ADMIN — AMOXICILLIN AND CLAVULANATE POTASSIUM 1 TABLET: 875; 125 TABLET, FILM COATED ORAL at 09:12

## 2018-12-28 RX ADMIN — HYDRALAZINE HYDROCHLORIDE 25 MG: 25 TABLET ORAL at 05:12

## 2018-12-28 RX ADMIN — STANDARDIZED SENNA CONCENTRATE AND DOCUSATE SODIUM 1 TABLET: 8.6; 5 TABLET, FILM COATED ORAL at 09:12

## 2018-12-28 RX ADMIN — ASPIRIN 81 MG: 81 TABLET, COATED ORAL at 09:12

## 2018-12-28 RX ADMIN — APIXABAN 5 MG: 5 TABLET, FILM COATED ORAL at 09:12

## 2018-12-28 RX ADMIN — HYDROCHLOROTHIAZIDE 25 MG: 25 TABLET ORAL at 09:12

## 2018-12-28 NOTE — PROGRESS NOTES
Transportation at 2:15pm with [employer].[phone]. Scheduled with [place].  Will bring wheel chair..Dee Tam RN, BSN, STN [facility]  12/28/2018

## 2018-12-28 NOTE — PROGRESS NOTES
TN sent d/c packet to [facility] through [employer]..Dee Tam RN, BSN, QUAN [facility]  12/28/2018   11:35AM [place] called [facility] [phone], spoke to Shirley says she will give to nursing to review and call me back with call report information..Dee Tam RN, BSN, QUAN [facility]  12/28/2018

## 2018-12-28 NOTE — PT/OT/SLP PROGRESS
Physical Therapy Treatment    Patient Name:  Nik Falk   MRN:  [id number]    Recommendations:     Discharge Recommendations:  nursing facility, skilled   Discharge Equipment Recommendations: walker, rolling   Barriers to discharge: decreased safety awareness, confusions     Assessment:     Nik Falk is a 87 y.o. female admitted with a medical diagnosis of Acute cystitis without hematuria.  She presents with the following impairments/functional limitations:  impaired endurance, weakness, impaired functional mobilty, impaired self care skills, gait instability, impaired balance, impaired cognition, decreased coordination, decreased lower extremity function, decreased upper extremity function, decreased safety awareness, pain, decreased ROM .Pt confused , required frequent V/T cues to re-direct pt for safety .     Rehab Prognosis: Fair; patient would benefit from acute skilled PT services to address these deficits and reach maximum level of function.    Recent Surgery: * No surgery found *      Plan:     During this hospitalization, patient to be seen 5 x/week to address the identified rehab impairments via gait training, therapeutic activities, therapeutic exercises and progress toward the following goals:    · Plan of Care Expires:  01/07/19    Subjective     Chief Complaint: BLE pain   Patient/Family Comments/goals: agreeable to therapy   Pain/Comfort:  · Location - Side 1: Bilateral  · Location 1: leg  · Pain Addressed 1: Pre-medicate for activity, Nurse notified      Objective:     Communicated with nurse  prior to session.  Patient found all lines intact and call button in reach bed alarm, telemetry  upon PT entry to room.     General Precautions: Standard, fall   Orthopedic Precautions:N/A   Braces: N/A     Functional Mobility:  · Bed Mobility:     · Rolling Left:  contact guard assistance  · Rolling Right: contact guard assistance  · Scooting: CGA to EOB , moderate assistance to HOB    · Supine to Sit: moderate assistance, HOB elevated , bedside rail.   · Sit to Supine: moderate assistance  · Transfers:   Attempted , however pt declined 2* BLE pain .       AM-PAC 6 CLICK MOBILITY  Turning over in bed (including adjusting bedclothes, sheets and blankets)?: 4  Sitting down on and standing up from a chair with arms (e.g., wheelchair, bedside commode, etc.): 2  Moving from lying on back to sitting on the side of the bed?: 2  Moving to and from a bed to a chair (including a wheelchair)?: 2  Need to walk in hospital room?: 2  Climbing 3-5 steps with a railing?: 1  Basic Mobility Total Score: 13       Therapeutic Activities and Exercises:   pt tolerated sitting balance EOB x 14 min with SBA/CGA.   Pt performed seated BLE x 10 reps : AP, LAQ, HS and supine BLE (AAROM BLE )  x 10 reps : HS,SAQ,  SLR, hip ABD/ADD. V/T cues for proper technique and sequence.     Patient left HOB elevated with all lines intact, call button in reach, bed alarm on, NURSE notified and AVASY  present..    GOALS:   Multidisciplinary Problems     Physical Therapy Goals        Problem: Physical Therapy Goal    Goal Priority Disciplines Outcome Goal Variances Interventions   Physical Therapy Goal     PT, PT/OT Ongoing (interventions implemented as appropriate)     Description:  Goals to be met by: 19     Patient will increase functional independence with mobility by performin. Supine to sit with Contact Guard Assistance  2. Sit to stand transfer with Contact Guard Assistance  3. Bed to chair transfer with Contact Guard Assistance using Rolling Walker  4. Gait  x 150 feet with Contact Guard Assistance using Rolling Walker.   5. Lower extremity exercise program x20 reps per handout, with supervision                      Time Tracking:     PT Received On: 18  PT Start Time: 1148     PT Stop Time: 1213  PT Total Time (min): 25 min     Billable Minutes: Therapeutic Activity 14 and Therapeutic Exercise 11    Treatment  Type: Treatment  PT/PTA: PTA     PTA Visit Number: 2     Nyla Wills, PTA  12/28/2018

## 2018-12-28 NOTE — PLAN OF CARE
"   [facility]       NURSING HOME ORDERS    12/28/2018    Admit to Nursing Home:  Regular Bed     Diagnoses:  Active Hospital Problems    Diagnosis  POA    *Acute cystitis without hematuria [N30.00]  Yes    Paroxysmal atrial fibrillation with RVR [I48.0]  No    Infectious encephalopathy [G93.49, B99.9]  Yes    ARF (acute renal failure) [N17.9]  Yes    Essential hypertension [I10]  Yes     Chronic    Hypokalemia [E87.6]  Yes    Vascular dementia without behavioral disturbance [F01.50]  Yes     Chronic    Moderate protein-calorie malnutrition [E44.0]  Yes     Chronic    Anemia [D64.9]  Yes     Chronic      Resolved Hospital Problems    Diagnosis Date Resolved POA    Hypomagnesemia [E83.42] 12/27/2018 Yes       Patient is homebound due to:  Acute cystitis without hematuria    Allergies:  Review of patient's allergies indicates:   Allergen Reactions    Aspirin Other (See Comments)     Pt states " I get nervous"       Vitals:   Routine, once daily    Diet: Cardiac   Supplement:  1 can every three times a day with meals                         Type:  House       Acitivities:    - Up in a chair each morning as tolerated   - Ambulate with assistance to bathroom   - Scheduled walks once each shift (every 8 hours)   - May use walker, cane, or self-propelled wheelchair    Nursing Precautions:     - Aspiration precautions:             - Assistance with meals            -  Upright 90 degrees befor during and after meals             -  Suction at bedside          - Fall precautions per nursing home protocol   - Seizure precaution per assisted protocol   - Decubitus precautions:        -  for positioning   - Pressure reducing foam mattress   - Turn patient every two hours. Use wedge pillows to anchor patient    CONSULTS:   Physical Therapy to evaluate and treat    MISCELLANEOUS CARE:      Routine Skin for Bedridden Patients:  Apply moisture barrier cream to all    skin folds and wet areas in " perineal area daily and after baths and                           all bowel movements.      Medications: Discontinue all previous medication orders, if any. See new list below.     Nik Falk   Home Medication Instructions LISA:[id number]    Printed on:12/28/18 7165   Medication Information                      amlodipine (NORVASC) 10 MG tablet  Take 10 mg by mouth once daily.             apixaban (ELIQUIS) 5 mg Tab  Take 1 tablet (5 mg total) by mouth 2 (two) times daily.             aspirin (ECOTRIN) 81 MG EC tablet  Take 81 mg by mouth once daily.             brimonidine-timolol (COMBIGAN) 0.2-0.5 % Drop  Place 1 drop into both eyes.             hydrALAZINE (APRESOLINE) 25 MG tablet  Take 1 tablet (25 mg total) by mouth every 8 (eight) hours.             hydroCHLOROthiazide (HYDRODIURIL) 25 MG tablet  Take 1 tablet (25 mg total) by mouth once daily.             labetalol (NORMODYNE) 300 MG tablet  Take 1 tablet (300 mg total) by mouth every 12 (twelve) hours.             losartan (COZAAR) 100 MG tablet  Take 100 mg by mouth once daily.                 _________________________________  Joann Breaux MD  12/28/2018

## 2018-12-28 NOTE — NURSING
Patient is calm and alert.  No IV access. Called report to nAtwan MELENDEZ and spoke with ARI Conteh.  Will continue to monitor patient.

## 2018-12-28 NOTE — PLAN OF CARE
How Severe Is Your Acne?: moderate Problem: Physical Therapy Goal  Goal: Physical Therapy Goal  Goals to be met by: 19     Patient will increase functional independence with mobility by performin. Supine to sit with Contact Guard Assistance  2. Sit to stand transfer with Contact Guard Assistance  3. Bed to chair transfer with Contact Guard Assistance using Rolling Walker  4. Gait  x 150 feet with Contact Guard Assistance using Rolling Walker.   5. Lower extremity exercise program x20 reps per handout, with supervision     Outcome: Ongoing (interventions implemented as appropriate)  Pt confused , required frequent V/T cues to re-direct pt for safety .        Is This A New Presentation, Or A Follow-Up?: Acne

## 2018-12-28 NOTE — PLAN OF CARE
Problem: Adult Inpatient Plan of Care  Goal: Plan of Care Review  Outcome: Ongoing (interventions implemented as appropriate)   12/28/18 7340   Plan of Care Review   Plan of Care Reviewed With patient     Patient is oriented to self and cooperative.  Incontinent x2 and BM today.  Poor appetite; encourage nutrition.  Turned frequently.  Telesitter in place.  Bed alarm set.  Will continue to monitor.

## 2018-12-28 NOTE — NURSING
Notified Dr Webster about scheduled med Labetalol 300mg; parameters were given and w/held scheduled med HR 60; /78 prn Hydralazine 25mg given.

## 2018-12-28 NOTE — DISCHARGE SUMMARY
[facility] Hospital Medicine  Discharge Summary      Patient Name: Nik Falk  MRN: [id number]  Admission Date: 2018  Hospital Length of Stay: 6 days  Discharge Date and Time:  2018 11:28 AM  Attending Physician: Joann Breaux MD   Discharging Provider: Joann Breaux MD  Primary Care Provider: Ronen Leong MD      HPI:   Ms. Falk is an 86 yo woman with advanced dementia, h/o stroke, essential hypertension, and questionable h/o CKD who presented to the ER with her daughter for confusion and falls. History is limited due to patient's dementia. Attempts to contact her daughter Yamilex x2 unsuccessful. Per the ER physician, the daughter reports that her mom normally ambulates adequately with a cane at home but has fallen twice in the past two days. The patient has reprotedly not been eating as much as usual. She didn't seem to recognize her daughter and was confused this morning which prompted the patient's daughter to bring her to the ER.  The patient denies current fevers, chills, abdominal discomfort, pelvic discomfort, chest pain. She does report knee pain. In the ER the patient was confused believing that she was at home but was oriented to her name and . Could not tell me the year. Was able to tell me her daughter's name is Yamilex.  Lab evaluation revealed likely pre-renal ARF as well as findings consistent with acute bacterial cystitis on UA. The patient was admitted to hospital medicine for further care.    * No surgery found *      Hospital Course:   Ms. Falk was admitted with AMS due to acute cystitis and ARF. She does have underlying dementia at baseline. Head CT and C-spine was overall unremarkable for any acute abnormalities. CXR was negative for infection. Pt was noted to have UA consistent with infection and had increase in BUN/creatinine of 30/2.4 and hypokalemia. Pt was started on empiric Rocephin and urine culture showed GNR. Blood cultures were not  done on admission. She was also treated with IVF and had close monitoring of renal function, and supplementation of her potassium. She became agitated overnight on 12/24. Per her family, this is baseline- she wanders out of the house and down the street at times at night. She went into Afib RVR on 12/24 that resolved with IV metoprolol x1. Discussed with family, started metoprolol and eliquis. TTE 12/24 with LVH, normal EF and diastolic function. Remains hypertensive- will change metoprolol to coreg. Still hypertensive- adjusting BP meds. Family is requesting nursing home placement; PPD placed. PT, OT recommending SNF. Patient completed antibiotics for UTI. Multiple changes made in BP meds. Discharged to nursing home.      Consults:   Consults (From admission, onward)        Status Ordering Provider     Inpatient consult to Social Work  Once     Provider:  (Not yet assigned)    Acknowledged CHARITY ULLOA     Inpatient consult to Social Work/Case Management  Once     Provider:  (Not yet assigned)    Acknowledged BARRERA RAYMUNDO     Inpatient consult to Social Work/Case Management  Once     Provider:  (Not yet assigned)    Acknowledged CHARITY ULLOA          No new Assessment & Plan notes have been filed under this hospital service since the last note was generated.  Service: Hospital Medicine    Final Active Diagnoses:    Diagnosis Date Noted POA    Paroxysmal atrial fibrillation with RVR [I48.0] 12/24/2018 No    ARF (acute renal failure) [N17.9] 12/22/2018 Yes    Essential hypertension [I10] 12/22/2018 Yes     Chronic    Vascular dementia without behavioral disturbance [F01.50] 12/22/2018 Yes     Chronic    Moderate protein-calorie malnutrition [E44.0] 12/22/2018 Yes     Chronic    Anemia [D64.9] 12/22/2018 Yes     Chronic      Problems Resolved During this Admission:    Diagnosis Date Noted Date Resolved POA    PRINCIPAL PROBLEM:  Acute cystitis without hematuria [N30.00] 12/22/2018 12/28/2018 Yes     Hypomagnesemia [E83.42] 12/24/2018 12/27/2018 Yes    Infectious encephalopathy [G93.49, B99.9] 12/22/2018 12/28/2018 Yes    Hypokalemia [E87.6] 12/22/2018 12/28/2018 Yes       Discharged Condition: good    Disposition: Long Term Care    Follow Up: With MD at [facility]    Patient Instructions:      Diet Cardiac     Notify your health care provider if you experience any of the following:  temperature >100.4     Notify your health care provider if you experience any of the following:  persistent nausea and vomiting or diarrhea     Notify your health care provider if you experience any of the following:  severe uncontrolled pain     Notify your health care provider if you experience any of the following:  redness, tenderness, or signs of infection (pain, swelling, redness, odor or green/yellow discharge around incision site)     Notify your health care provider if you experience any of the following:  difficulty breathing or increased cough     Notify your health care provider if you experience any of the following:  severe persistent headache     Notify your health care provider if you experience any of the following:  worsening rash     Notify your health care provider if you experience any of the following:  persistent dizziness, light-headedness, or visual disturbances     Notify your health care provider if you experience any of the following:  increased confusion or weakness     Activity as tolerated       Significant Diagnostic Studies: Labs: All labs within the past 24 hours have been reviewed    Pending Diagnostic Studies:     None         Medications:  Reconciled Home Medications:      Medication List      START taking these medications    apixaban 5 mg Tab  Commonly known as:  ELIQUIS  Take 1 tablet (5 mg total) by mouth 2 (two) times daily.     hydrALAZINE 25 MG tablet  Commonly known as:  APRESOLINE  Take 1 tablet (25 mg total) by mouth every 8 (eight) hours.     hydroCHLOROthiazide 25 MG  tablet  Commonly known as:  HYDRODIURIL  Take 1 tablet (25 mg total) by mouth once daily.  Start taking on:  12/29/2018     labetalol 300 MG tablet  Commonly known as:  NORMODYNE  Take 1 tablet (300 mg total) by mouth every 12 (twelve) hours.        CONTINUE taking these medications    amLODIPine 10 MG tablet  Commonly known as:  NORVASC  Take 10 mg by mouth once daily.     aspirin 81 MG EC tablet  Commonly known as:  ECOTRIN  Take 81 mg by mouth once daily.     brimonidine-timolol 0.2-0.5 % Drop  Commonly known as:  COMBIGAN  Place 1 drop into both eyes.     losartan 100 MG tablet  Commonly known as:  COZAAR  Take 100 mg by mouth once daily.        STOP taking these medications    donepezil 5 MG tablet  Commonly known as:  ARICEPT     montelukast 10 mg tablet  Commonly known as:  SINGULAIR     traMADol 50 mg tablet  Commonly known as:  ULTRAM     traZODone 50 MG tablet  Commonly known as:  DESYREL            Indwelling Lines/Drains at time of discharge:   Lines/Drains/Airways          None          Time spent on the discharge of patient: 35 minutes  Patient was seen and examined on the date of discharge and determined to be suitable for discharge.         Joann Breaux MD  Department of Hospital Medicine  [facility]

## 2018-12-28 NOTE — PLAN OF CARE
12/28/18 1146   Post-Acute Status   Post-Acute Authorization Placement   Post-Acute Placement Status Pending Clinical Review

## 2019-01-04 NOTE — PLAN OF CARE
01/04/19 1617   Final Note   Assessment Type Final Discharge Note   Anticipated Discharge Disposition [facility]   What phone number can be called within the next 1-3 days to see how you are doing after discharge? (see chart)   Hospital Follow Up  Appt(s) scheduled? Yes   Discharge plans and expectations educations in teach back method with documentation complete? Yes   Right Care Referral Info   Post Acute Recommendation SNF / Sub-Acute Rehab   Referral Type SNF/SUB-ACUTE REHAB   Facility Name [facility]   Street [address] [place] [id number]

## 2019-03-12 ENCOUNTER — HOSPITAL ENCOUNTER (INPATIENT)
Facility: HOSPITAL | Age: 84
LOS: 4 days | Discharge: NURSING, REHAB OR LONG-TERM CARE | DRG: 871 | End: 2019-03-16
Attending: EMERGENCY MEDICINE | Admitting: INTERNAL MEDICINE
Payer: MEDICARE

## 2019-03-12 DIAGNOSIS — R41.82 ALTERED MENTAL STATUS: ICD-10-CM

## 2019-03-12 DIAGNOSIS — E87.0 HYPERNATREMIA: ICD-10-CM

## 2019-03-12 DIAGNOSIS — E86.0 SEVERE DEHYDRATION: Primary | ICD-10-CM

## 2019-03-12 DIAGNOSIS — H10.33 ACUTE BACTERIAL CONJUNCTIVITIS OF BOTH EYES: ICD-10-CM

## 2019-03-12 DIAGNOSIS — N30.00 ACUTE CYSTITIS WITHOUT HEMATURIA: ICD-10-CM

## 2019-03-12 LAB
ALBUMIN SERPL BCP-MCNC: 3.3 G/DL
ALP SERPL-CCNC: 63 U/L
ALT SERPL W/O P-5'-P-CCNC: 12 U/L
ANION GAP SERPL CALC-SCNC: 12 MMOL/L
AORTIC ROOT ANNULUS: 2.85 CM
AORTIC VALVE CUSP SEPERATION: 2.08 CM
ASCENDING AORTA: 2.39 CM
AST SERPL-CCNC: 18 U/L
AV INDEX (PROSTH): 0.75
AV MEAN GRADIENT: 3.45 MMHG
AV PEAK GRADIENT: 6.55 MMHG
AV VALVE AREA: 2.27 CM2
AV VELOCITY RATIO: 0.79
BACTERIA #/AREA URNS HPF: ABNORMAL /HPF
BASOPHILS # BLD AUTO: 0.02 K/UL
BASOPHILS NFR BLD: 0.2 %
BILIRUB SERPL-MCNC: 1.2 MG/DL
BILIRUB UR QL STRIP: NEGATIVE
BUN SERPL-MCNC: 105 MG/DL
CALCIUM SERPL-MCNC: 9 MG/DL
CHLORIDE SERPL-SCNC: 125 MMOL/L
CLARITY UR: ABNORMAL
CO2 SERPL-SCNC: 16 MMOL/L
COLOR UR: YELLOW
CREAT SERPL-MCNC: 4 MG/DL
CV ECHO LV RWT: 0.51 CM
DIFFERENTIAL METHOD: ABNORMAL
DOP CALC AO PEAK VEL: 1.28 M/S
DOP CALC AO VTI: 29.44 CM
DOP CALC LVOT AREA: 3.02 CM2
DOP CALC LVOT DIAMETER: 1.96 CM
DOP CALC LVOT PEAK VEL: 1.01 M/S
DOP CALC LVOT STROKE VOLUME: 66.95 CM3
DOP CALCLVOT PEAK VEL VTI: 22.2 CM
E WAVE DECELERATION TIME: 206.93 MSEC
E/A RATIO: 1.2
ECHO LV POSTERIOR WALL: 1.05 CM (ref 0.6–1.1)
EOSINOPHIL # BLD AUTO: 0.1 K/UL
EOSINOPHIL NFR BLD: 0.6 %
ERYTHROCYTE [DISTWIDTH] IN BLOOD BY AUTOMATED COUNT: 15.5 %
EST. GFR  (AFRICAN AMERICAN): 11 ML/MIN/1.73 M^2
EST. GFR  (NON AFRICAN AMERICAN): 10 ML/MIN/1.73 M^2
FRACTIONAL SHORTENING: 34 % (ref 28–44)
GLUCOSE SERPL-MCNC: 93 MG/DL
GLUCOSE UR QL STRIP: ABNORMAL
HCT VFR BLD AUTO: 32 %
HGB BLD-MCNC: 10.3 G/DL
HGB UR QL STRIP: ABNORMAL
HYALINE CASTS #/AREA URNS LPF: 0 /LPF
INTERVENTRICULAR SEPTUM: 1.02 CM (ref 0.6–1.1)
IVRT: 0.07 MSEC
KETONES UR QL STRIP: ABNORMAL
LA MINOR: 4.03 CM
LA WIDTH: 3.17 CM
LACTATE SERPL-SCNC: 1.5 MMOL/L
LEFT ATRIUM SIZE: 3.82 CM
LEFT INTERNAL DIMENSION IN SYSTOLE: 2.74 CM (ref 2.1–4)
LEFT VENTRICLE DIASTOLIC VOLUME: 75.84 ML
LEFT VENTRICLE SYSTOLIC VOLUME: 28.01 ML
LEFT VENTRICULAR INTERNAL DIMENSION IN DIASTOLE: 4.14 CM (ref 3.5–6)
LEFT VENTRICULAR MASS: 140.81 G
LEUKOCYTE ESTERASE UR QL STRIP: ABNORMAL
LV LATERAL E/E' RATIO: 24.5
LYMPHOCYTES # BLD AUTO: 2.7 K/UL
LYMPHOCYTES NFR BLD: 21.5 %
MAGNESIUM SERPL-MCNC: 2.2 MG/DL
MCH RBC QN AUTO: 31.9 PG
MCHC RBC AUTO-ENTMCNC: 32.2 G/DL
MCV RBC AUTO: 99 FL
MICROSCOPIC COMMENT: ABNORMAL
MONOCYTES # BLD AUTO: 1.1 K/UL
MONOCYTES NFR BLD: 9 %
MV PEAK A VEL: 1.22 M/S
MV PEAK E VEL: 1.47 M/S
NEUTROPHILS # BLD AUTO: 8.6 K/UL
NEUTROPHILS NFR BLD: 68.2 %
NITRITE UR QL STRIP: NEGATIVE
NON-SQ EPI CELLS #/AREA URNS HPF: 1 /HPF
PH UR STRIP: 7 [PH] (ref 5–8)
PISA TR MAX VEL: 3.67 M/S
PLATELET # BLD AUTO: 262 K/UL
PMV BLD AUTO: 10.7 FL
POTASSIUM SERPL-SCNC: 4 MMOL/L
PROT SERPL-MCNC: 7.4 G/DL
PROT UR QL STRIP: ABNORMAL
PV PEAK VELOCITY: 1.01 CM/S
RA PRESSURE: 3 MMHG
RBC # BLD AUTO: 3.23 M/UL
RBC #/AREA URNS HPF: 4 /HPF (ref 0–4)
SINUS: 2.99 CM
SODIUM SERPL-SCNC: 153 MMOL/L
SP GR UR STRIP: 1.01 (ref 1–1.03)
SQUAMOUS #/AREA URNS HPF: 2 /HPF
STJ: 2.25 CM
TDI LATERAL: 0.06
TR MAX PG: 53.88 MMHG
TRICUSPID ANNULAR PLANE SYSTOLIC EXCURSION: 2.3 CM
TV REST PULMONARY ARTERY PRESSURE: 57 MMHG
URN SPEC COLLECT METH UR: ABNORMAL
UROBILINOGEN UR STRIP-ACNC: ABNORMAL EU/DL
WBC # BLD AUTO: 12.55 K/UL
WBC #/AREA URNS HPF: >100 /HPF (ref 0–5)
WBC CLUMPS URNS QL MICRO: ABNORMAL
YEAST URNS QL MICRO: ABNORMAL

## 2019-03-12 PROCEDURE — 87086 URINE CULTURE/COLONY COUNT: CPT

## 2019-03-12 PROCEDURE — 83735 ASSAY OF MAGNESIUM: CPT

## 2019-03-12 PROCEDURE — 80053 COMPREHEN METABOLIC PANEL: CPT

## 2019-03-12 PROCEDURE — 87088 URINE BACTERIA CULTURE: CPT

## 2019-03-12 PROCEDURE — 87077 CULTURE AEROBIC IDENTIFY: CPT

## 2019-03-12 PROCEDURE — 82570 ASSAY OF URINE CREATININE: CPT

## 2019-03-12 PROCEDURE — 93005 ELECTROCARDIOGRAM TRACING: CPT

## 2019-03-12 PROCEDURE — 93010 ELECTROCARDIOGRAM REPORT: CPT | Mod: ,,, | Performed by: INTERNAL MEDICINE

## 2019-03-12 PROCEDURE — 96365 THER/PROPH/DIAG IV INF INIT: CPT

## 2019-03-12 PROCEDURE — 87040 BLOOD CULTURE FOR BACTERIA: CPT | Mod: 59

## 2019-03-12 PROCEDURE — 21400001 HC TELEMETRY ROOM

## 2019-03-12 PROCEDURE — 81000 URINALYSIS NONAUTO W/SCOPE: CPT

## 2019-03-12 PROCEDURE — 93010 EKG 12-LEAD: ICD-10-PCS | Mod: ,,, | Performed by: INTERNAL MEDICINE

## 2019-03-12 PROCEDURE — 84300 ASSAY OF URINE SODIUM: CPT

## 2019-03-12 PROCEDURE — 85025 COMPLETE CBC W/AUTO DIFF WBC: CPT

## 2019-03-12 PROCEDURE — 83605 ASSAY OF LACTIC ACID: CPT

## 2019-03-12 PROCEDURE — 96361 HYDRATE IV INFUSION ADD-ON: CPT

## 2019-03-12 PROCEDURE — 25000003 PHARM REV CODE 250: Performed by: EMERGENCY MEDICINE

## 2019-03-12 PROCEDURE — 99285 EMERGENCY DEPT VISIT HI MDM: CPT | Mod: 25

## 2019-03-12 PROCEDURE — 63600175 PHARM REV CODE 636 W HCPCS: Performed by: EMERGENCY MEDICINE

## 2019-03-12 PROCEDURE — 87186 SC STD MICRODIL/AGAR DIL: CPT

## 2019-03-12 RX ORDER — LABETALOL 100 MG/1
300 TABLET, FILM COATED ORAL EVERY 12 HOURS
Status: DISCONTINUED | OUTPATIENT
Start: 2019-03-13 | End: 2019-03-16 | Stop reason: HOSPADM

## 2019-03-12 RX ORDER — SODIUM CHLORIDE 0.9 % (FLUSH) 0.9 %
5 SYRINGE (ML) INJECTION
Status: DISCONTINUED | OUTPATIENT
Start: 2019-03-12 | End: 2019-03-13

## 2019-03-12 RX ORDER — BRIMONIDINE TARTRATE AND TIMOLOL MALEATE 2; 5 MG/ML; MG/ML
1 SOLUTION OPHTHALMIC
Status: DISCONTINUED | OUTPATIENT
Start: 2019-03-13 | End: 2019-03-13 | Stop reason: CLARIF

## 2019-03-12 RX ORDER — ENOXAPARIN SODIUM 100 MG/ML
30 INJECTION SUBCUTANEOUS EVERY 24 HOURS
Status: DISCONTINUED | OUTPATIENT
Start: 2019-03-13 | End: 2019-03-13

## 2019-03-12 RX ORDER — OLANZAPINE 5 MG/1
5 TABLET, ORALLY DISINTEGRATING ORAL EVERY 12 HOURS
Status: DISCONTINUED | OUTPATIENT
Start: 2019-03-13 | End: 2019-03-16 | Stop reason: HOSPADM

## 2019-03-12 RX ORDER — HYDRALAZINE HYDROCHLORIDE 25 MG/1
25 TABLET, FILM COATED ORAL EVERY 8 HOURS
Status: DISCONTINUED | OUTPATIENT
Start: 2019-03-13 | End: 2019-03-16 | Stop reason: HOSPADM

## 2019-03-12 RX ORDER — SODIUM CHLORIDE 9 MG/ML
INJECTION, SOLUTION INTRAVENOUS CONTINUOUS
Status: DISCONTINUED | OUTPATIENT
Start: 2019-03-13 | End: 2019-03-13

## 2019-03-12 RX ORDER — AMLODIPINE BESYLATE 5 MG/1
10 TABLET ORAL DAILY
Status: DISCONTINUED | OUTPATIENT
Start: 2019-03-13 | End: 2019-03-16 | Stop reason: HOSPADM

## 2019-03-12 RX ORDER — OLANZAPINE 5 MG/1
5 TABLET, ORALLY DISINTEGRATING ORAL ONCE
Status: COMPLETED | OUTPATIENT
Start: 2019-03-12 | End: 2019-03-12

## 2019-03-12 RX ORDER — POLYETHYLENE GLYCOL 3350 17 G/17G
17 POWDER, FOR SOLUTION ORAL DAILY
Status: DISCONTINUED | OUTPATIENT
Start: 2019-03-13 | End: 2019-03-16 | Stop reason: HOSPADM

## 2019-03-12 RX ORDER — ASPIRIN 81 MG/1
81 TABLET ORAL DAILY
Status: DISCONTINUED | OUTPATIENT
Start: 2019-03-13 | End: 2019-03-16 | Stop reason: HOSPADM

## 2019-03-12 RX ADMIN — SODIUM CHLORIDE 1000 ML: 0.9 INJECTION, SOLUTION INTRAVENOUS at 07:03

## 2019-03-12 RX ADMIN — OLANZAPINE 5 MG: 5 TABLET, ORALLY DISINTEGRATING ORAL at 04:03

## 2019-03-12 RX ADMIN — CEFTRIAXONE 2 G: 2 INJECTION, SOLUTION INTRAVENOUS at 07:03

## 2019-03-12 RX ADMIN — SODIUM CHLORIDE 1000 ML: 0.9 INJECTION, SOLUTION INTRAVENOUS at 04:03

## 2019-03-12 NOTE — ED TRIAGE NOTES
"Pt presents to ED for altered mental status. Pt daughter at bedside states pt is not acting normal. Pt seen by MD at facility. Pt daughter states " she doesn't look as well as she did two weeks ago." pt daughter concerned about medications pt is taking.  "

## 2019-03-12 NOTE — ED PROVIDER NOTES
"Encounter Date: 3/12/2019       History     Chief Complaint   Patient presents with    Altered Mental Status     From [facility] with decreased LOC. Per EMS, daughter states she usually talks, but today she is only moaning. Per EMS, pts initial BP was 88/44     HPI   This 87-year-old female presents to the emergency room with screaming and restless behavior for weeks.  The patient is here to be evaluated.  The patient can give no history.  The daughter reports no vomiting diarrhea or other problems.  Patient has had a progressive decline in her mental status over the course of the last 6 months.  She now cannot speak.  Review of patient's allergies indicates:   Allergen Reactions    Aspirin Other (See Comments)     Pt states " I get nervous"     Past Medical History:   Diagnosis Date    Arthritis     Dementia     Hypertension     Renal disorder     Stroke      Past Surgical History:   Procedure Laterality Date    CHOLECYSTECTOMY-LAPAROSCOPIC N/A 9/25/2015    Performed by Cristian Escalera MD at [facility] OR     History reviewed. No pertinent family history.  Social History     Tobacco Use    Smoking status: Former Smoker    Smokeless tobacco: Never Used   Substance Use Topics    Alcohol use: No    Drug use: No     Review of Systems  The patient was questioned specifically with regard to the following.  General: Fever, chills, sweats. Neuro: Headache. Eyes: eye problems. ENT: Ear pain, sore throat. Cardiovascular: Chest pain. Respiratory: Cough, shortness of breath. Gastrointestinal: Abdominal pain, vomiting, diarrhea. Genitourinary: Painful urination.  Musculoskeletal: Arm and leg problems. Skin: Rash.  The review of systems was negative except for the following:  Screaming behavior will not communicate.  Will not walk.  Progressive altered mental status  Physical Exam     Initial Vitals [03/12/19 1513]   BP Pulse Resp Temp SpO2   126/66 77 (!) 24 98.1 °F (36.7 °C) 98 %      MAP       --     "     Physical Exam  The patient was examined specifically for the following:   General:No significant distress, Good color, Warm and dry. Head and neck:Scalp atraumatic, Neck supple. Neurological:Appropriate conversation, Gross motor deficits. Eyes:Conjugate gaze, Clear corneas. ENT: No epistaxis. Cardiac: Regular rate and rhythm, Grossly normal heart tones. Pulmonary: Wheezing, Rales. Gastrointestinal: Abdominal tenderness, Abdominal distention. Musculoskeletal: Extremity deformity, Apparent pain with range of motion of the joints. Skin: Rash.   The findings on examination were normal except for the following:  Patient has dry mucous membranes.  I cannot feel pulses in the feet.  The lungs are clear.  The heart tones are normal. The patient is moaning and writhing.  The abdomen is nontender. The patient has good motor movement in all 4 extremities.  She is not capable conversation.  The neck is supple.  The abdomen is nontender.  Lungs are clear.  The heart tones are normal. Extremities are nontender.  ED Course   Procedures  Labs Reviewed   COMPREHENSIVE METABOLIC PANEL - Abnormal; Notable for the following components:       Result Value    Sodium 153 (*)     Chloride 125 (*)     CO2 16 (*)     BUN, Bld 105 (*)     Creatinine 4.0 (*)     Albumin 3.3 (*)     Total Bilirubin 1.2 (*)     eGFR if  11 (*)     eGFR if non  10 (*)     All other components within normal limits   CBC W/ AUTO DIFFERENTIAL - Abnormal; Notable for the following components:    RBC 3.23 (*)     Hemoglobin 10.3 (*)     Hematocrit 32.0 (*)     MCV 99 (*)     MCH 31.9 (*)     RDW 15.5 (*)     Gran # (ANC) 8.6 (*)     Mono # 1.1 (*)     All other components within normal limits   URINALYSIS - Abnormal; Notable for the following components:    Appearance, UA Cloudy (*)     Protein, UA 2+ (*)     Glucose, UA 1+ (*)     Ketones, UA Trace (*)     Occult Blood UA 1+ (*)     Urobilinogen, UA 2.0-3.0 (*)     Leukocytes, UA  2+ (*)     All other components within normal limits   URINALYSIS MICROSCOPIC - Abnormal; Notable for the following components:    WBC, UA >100 (*)     WBC Clumps, UA Many (*)     Bacteria, UA Many (*)     Yeast, UA Rare (*)     Non-Squam Epith 1 (*)     All other components within normal limits   CULTURE, BLOOD   CULTURE, BLOOD   CULTURE, URINE   LACTIC ACID, PLASMA   MAGNESIUM     EKG Readings: (Independently Interpreted)   This patient is in ventricular bigeminy.  There are nonspecific T-wave changes.  The patient has definite sinus  rhythm.  There are nonspecific T-wave changes.  ST segments are normal.  There is no evidence of acute myocardial infarction or malignant arrhythmia.       Imaging Results          CT Head Without Contrast (Final result)  Result time 03/12/19 17:39:30    Final result by Zak Villar MD (03/12/19 17:39:30)                 Impression:      No acute intracranial abnormalities identified.  No significant detrimental change compared to previous CT head from December 2018.      Electronically signed by: Zak Villar MD  Date:    03/12/2019  Time:    17:39             Narrative:    EXAMINATION:  CT HEAD WITHOUT CONTRAST    CLINICAL HISTORY:  Confusion/delirium, altered LOC, unexplained; Altered mental status, unspecified    TECHNIQUE:  Low dose axial images were obtained through the head.  Coronal and sagittal reformations were also performed. Contrast was not administered.    COMPARISON:  CT head from December 2018.    FINDINGS:  There is generalized cerebral volume loss with chronic microvascular ischemic disease.  No evidence of acute/recent major vascular distribution cerebral infarction, intraparenchymal hemorrhage, or intra-axial space occupying lesion.  Stable small calcified meningioma seen along the cribriform plate at midline.  The ventricular system is normal in size and configuration with no evidence of hydrocephalus. No effacement of the skull-base cisterns. No abnormal  extra-axial fluid collections or blood products. Visualized paranasal sinuses and mastoid air cells are clear. The calvarium shows no significant abnormality.                               X-Ray Chest AP Portable (Final result)  Result time 03/12/19 17:12:21    Final result by Zak Villar MD (03/12/19 17:12:21)                 Impression:      No acute cardiopulmonary process identified.      Electronically signed by: Zak Villar MD  Date:    03/12/2019  Time:    17:12             Narrative:    EXAMINATION:  XR CHEST AP PORTABLE    CLINICAL HISTORY:  chest pain;    TECHNIQUE:  Single frontal view of the chest was performed.    COMPARISON:  December 2018.    FINDINGS:  Cardiac silhouette is normal in size.  Lungs are symmetrically expanded.  No evidence of focal consolidative process, pneumothorax, or significant effusion.  No acute osseous abnormality identified.                              Medical decision making:  Given the above, this patient presents to the emergency room with a markedly elevated sodium and chloride.  She has a BUN of 104.  This patient is severely dehydrated.  She also has a urinary tract infection.  Her combative behavior responded nicely to Zyprexa.  I will admit the patient for treatment of the urinary tract infection severe dehydration.  I will consult Neurology for the patient's altered mental status.  The patient has not seen a neurologist since her altered mental status started.  The patient does have a history of a stroke in the very distant past.                          Clinical Impression:       ICD-10-CM ICD-9-CM   1. Severe dehydration E86.0 276.51   2. Altered mental status R41.82 780.97   3. Hypernatremia E87.0 276.0   4. Acute cystitis without hematuria N30.00 595.0                                Dl Worrell MD  03/12/19 1933

## 2019-03-13 PROBLEM — N30.00 ACUTE CYSTITIS: Status: ACTIVE | Noted: 2019-03-12

## 2019-03-13 PROBLEM — D64.9 ANEMIA: Chronic | Status: RESOLVED | Noted: 2018-12-22 | Resolved: 2019-03-13

## 2019-03-13 PROBLEM — D63.8 ANEMIA OF CHRONIC DISEASE: Chronic | Status: ACTIVE | Noted: 2019-03-13

## 2019-03-13 PROBLEM — D63.8 ANEMIA OF CHRONIC DISEASE: Status: ACTIVE | Noted: 2019-03-13

## 2019-03-13 PROBLEM — A41.9 SEVERE SEPSIS: Status: ACTIVE | Noted: 2019-03-13

## 2019-03-13 PROBLEM — Z86.73 HISTORY OF CVA (CEREBROVASCULAR ACCIDENT): Chronic | Status: ACTIVE | Noted: 2019-03-13

## 2019-03-13 PROBLEM — Z79.01 CHRONIC ANTICOAGULATION: Chronic | Status: ACTIVE | Noted: 2019-03-13

## 2019-03-13 PROBLEM — I48.0 PAROXYSMAL ATRIAL FIBRILLATION: Chronic | Status: ACTIVE | Noted: 2018-12-24

## 2019-03-13 PROBLEM — N17.9 ARF (ACUTE RENAL FAILURE): Status: RESOLVED | Noted: 2018-12-22 | Resolved: 2019-03-13

## 2019-03-13 PROBLEM — N19 UREMIC ENCEPHALOPATHY: Status: ACTIVE | Noted: 2019-03-13

## 2019-03-13 PROBLEM — N17.9 ACUTE RENAL FAILURE: Status: ACTIVE | Noted: 2019-03-13

## 2019-03-13 PROBLEM — E44.0 MODERATE PROTEIN-CALORIE MALNUTRITION: Chronic | Status: RESOLVED | Noted: 2018-12-22 | Resolved: 2019-03-13

## 2019-03-13 PROBLEM — G93.49 UREMIC ENCEPHALOPATHY: Status: ACTIVE | Noted: 2019-03-13

## 2019-03-13 PROBLEM — R65.20 SEVERE SEPSIS: Status: ACTIVE | Noted: 2019-03-13

## 2019-03-13 LAB
ALBUMIN SERPL BCP-MCNC: 2.8 G/DL
ALP SERPL-CCNC: 56 U/L
ALT SERPL W/O P-5'-P-CCNC: 10 U/L
ANION GAP SERPL CALC-SCNC: 10 MMOL/L
AST SERPL-CCNC: 18 U/L
BASOPHILS # BLD AUTO: 0.02 K/UL
BASOPHILS NFR BLD: 0.2 %
BILIRUB SERPL-MCNC: 0.6 MG/DL
BUN SERPL-MCNC: 96 MG/DL
CALCIUM SERPL-MCNC: 8.4 MG/DL
CHLORIDE SERPL-SCNC: 129 MMOL/L
CO2 SERPL-SCNC: 15 MMOL/L
CREAT SERPL-MCNC: 3.2 MG/DL
CREAT UR-MCNC: 132.4 MG/DL
DIFFERENTIAL METHOD: ABNORMAL
EOSINOPHIL # BLD AUTO: 0.1 K/UL
EOSINOPHIL NFR BLD: 1.2 %
ERYTHROCYTE [DISTWIDTH] IN BLOOD BY AUTOMATED COUNT: 16 %
EST. GFR  (AFRICAN AMERICAN): 14 ML/MIN/1.73 M^2
EST. GFR  (NON AFRICAN AMERICAN): 12 ML/MIN/1.73 M^2
GLUCOSE SERPL-MCNC: 96 MG/DL
HCT VFR BLD AUTO: 26.5 %
HGB BLD-MCNC: 8.3 G/DL
LYMPHOCYTES # BLD AUTO: 2.5 K/UL
LYMPHOCYTES NFR BLD: 20.4 %
MAGNESIUM SERPL-MCNC: 2.2 MG/DL
MCH RBC QN AUTO: 31.6 PG
MCHC RBC AUTO-ENTMCNC: 31.3 G/DL
MCV RBC AUTO: 101 FL
MONOCYTES # BLD AUTO: 1.5 K/UL
MONOCYTES NFR BLD: 12.6 %
NEUTROPHILS # BLD AUTO: 7.9 K/UL
NEUTROPHILS NFR BLD: 65.6 %
PHOSPHATE SERPL-MCNC: 4.9 MG/DL
PLATELET # BLD AUTO: 260 K/UL
PMV BLD AUTO: 10.4 FL
POCT GLUCOSE: 147 MG/DL (ref 70–110)
POTASSIUM SERPL-SCNC: 4.1 MMOL/L
PROT SERPL-MCNC: 6.5 G/DL
RBC # BLD AUTO: 2.63 M/UL
SODIUM SERPL-SCNC: 154 MMOL/L
SODIUM UR-SCNC: 58 MMOL/L
WBC # BLD AUTO: 12.01 K/UL

## 2019-03-13 PROCEDURE — 84100 ASSAY OF PHOSPHORUS: CPT

## 2019-03-13 PROCEDURE — 25000003 PHARM REV CODE 250: Performed by: EMERGENCY MEDICINE

## 2019-03-13 PROCEDURE — 25000003 PHARM REV CODE 250: Performed by: INTERNAL MEDICINE

## 2019-03-13 PROCEDURE — 80053 COMPREHEN METABOLIC PANEL: CPT

## 2019-03-13 PROCEDURE — G8997 SWALLOW GOAL STATUS: HCPCS | Mod: CK

## 2019-03-13 PROCEDURE — 21400001 HC TELEMETRY ROOM

## 2019-03-13 PROCEDURE — 36415 COLL VENOUS BLD VENIPUNCTURE: CPT

## 2019-03-13 PROCEDURE — G8996 SWALLOW CURRENT STATUS: HCPCS | Mod: CK

## 2019-03-13 PROCEDURE — 85025 COMPLETE CBC W/AUTO DIFF WBC: CPT

## 2019-03-13 PROCEDURE — 83735 ASSAY OF MAGNESIUM: CPT

## 2019-03-13 PROCEDURE — 92610 EVALUATE SWALLOWING FUNCTION: CPT

## 2019-03-13 PROCEDURE — 63600175 PHARM REV CODE 636 W HCPCS: Performed by: EMERGENCY MEDICINE

## 2019-03-13 RX ORDER — TIMOLOL MALEATE 5 MG/ML
1 SOLUTION/ DROPS OPHTHALMIC
Status: DISCONTINUED | OUTPATIENT
Start: 2019-03-13 | End: 2019-03-16 | Stop reason: HOSPADM

## 2019-03-13 RX ORDER — ONDANSETRON 2 MG/ML
8 INJECTION INTRAMUSCULAR; INTRAVENOUS EVERY 8 HOURS PRN
Status: DISCONTINUED | OUTPATIENT
Start: 2019-03-13 | End: 2019-03-16 | Stop reason: HOSPADM

## 2019-03-13 RX ORDER — SODIUM CHLORIDE 9 MG/ML
INJECTION, SOLUTION INTRAVENOUS CONTINUOUS
Status: DISCONTINUED | OUTPATIENT
Start: 2019-03-13 | End: 2019-03-13

## 2019-03-13 RX ORDER — RAMELTEON 8 MG/1
8 TABLET ORAL NIGHTLY PRN
Status: DISCONTINUED | OUTPATIENT
Start: 2019-03-13 | End: 2019-03-16 | Stop reason: HOSPADM

## 2019-03-13 RX ORDER — ACETAMINOPHEN 325 MG/1
650 TABLET ORAL EVERY 6 HOURS PRN
Status: DISCONTINUED | OUTPATIENT
Start: 2019-03-13 | End: 2019-03-16 | Stop reason: HOSPADM

## 2019-03-13 RX ORDER — ACETAMINOPHEN 500 MG
500 TABLET ORAL EVERY 6 HOURS PRN
Status: DISCONTINUED | OUTPATIENT
Start: 2019-03-13 | End: 2019-03-13

## 2019-03-13 RX ORDER — ERYTHROMYCIN 5 MG/G
OINTMENT OPHTHALMIC EVERY 4 HOURS
Status: DISCONTINUED | OUTPATIENT
Start: 2019-03-13 | End: 2019-03-16 | Stop reason: HOSPADM

## 2019-03-13 RX ORDER — CLONIDINE HYDROCHLORIDE 0.1 MG/1
0.1 TABLET ORAL 3 TIMES DAILY PRN
Status: DISCONTINUED | OUTPATIENT
Start: 2019-03-13 | End: 2019-03-16 | Stop reason: HOSPADM

## 2019-03-13 RX ORDER — BRIMONIDINE TARTRATE 2 MG/ML
1 SOLUTION/ DROPS OPHTHALMIC
Status: DISCONTINUED | OUTPATIENT
Start: 2019-03-13 | End: 2019-03-16 | Stop reason: HOSPADM

## 2019-03-13 RX ORDER — DEXTROSE MONOHYDRATE 50 MG/ML
INJECTION, SOLUTION INTRAVENOUS CONTINUOUS
Status: ACTIVE | OUTPATIENT
Start: 2019-03-13 | End: 2019-03-14

## 2019-03-13 RX ADMIN — SODIUM CHLORIDE: 0.9 INJECTION, SOLUTION INTRAVENOUS at 12:03

## 2019-03-13 RX ADMIN — APIXABAN 2.5 MG: 2.5 TABLET, FILM COATED ORAL at 08:03

## 2019-03-13 RX ADMIN — BRIMONIDINE TARTRATE 1 DROP: 2 SOLUTION OPHTHALMIC at 03:03

## 2019-03-13 RX ADMIN — TIMOLOL MALEATE 1 DROP: 5 SOLUTION OPHTHALMIC at 01:03

## 2019-03-13 RX ADMIN — OLANZAPINE 5 MG: 5 TABLET, ORALLY DISINTEGRATING ORAL at 12:03

## 2019-03-13 RX ADMIN — BRIMONIDINE TARTRATE 1 DROP: 2 SOLUTION OPHTHALMIC at 01:03

## 2019-03-13 RX ADMIN — ACETAMINOPHEN 650 MG: 325 TABLET, FILM COATED ORAL at 11:03

## 2019-03-13 RX ADMIN — CLONIDINE HYDROCHLORIDE 0.1 MG: 0.1 TABLET ORAL at 11:03

## 2019-03-13 RX ADMIN — OLANZAPINE 5 MG: 5 TABLET, ORALLY DISINTEGRATING ORAL at 08:03

## 2019-03-13 RX ADMIN — ERYTHROMYCIN: 5 OINTMENT OPHTHALMIC at 01:03

## 2019-03-13 RX ADMIN — APIXABAN 2.5 MG: 2.5 TABLET, FILM COATED ORAL at 12:03

## 2019-03-13 RX ADMIN — TIMOLOL MALEATE 1 DROP: 5 SOLUTION OPHTHALMIC at 03:03

## 2019-03-13 RX ADMIN — HYDRALAZINE HYDROCHLORIDE 25 MG: 25 TABLET ORAL at 09:03

## 2019-03-13 RX ADMIN — ERYTHROMYCIN: 5 OINTMENT OPHTHALMIC at 07:03

## 2019-03-13 RX ADMIN — POLYETHYLENE GLYCOL 3350 17 G: 17 POWDER, FOR SOLUTION ORAL at 08:03

## 2019-03-13 RX ADMIN — ERYTHROMYCIN: 5 OINTMENT OPHTHALMIC at 09:03

## 2019-03-13 RX ADMIN — LABETALOL HYDROCHLORIDE 300 MG: 100 TABLET, FILM COATED ORAL at 08:03

## 2019-03-13 RX ADMIN — ASPIRIN 81 MG: 81 TABLET, COATED ORAL at 08:03

## 2019-03-13 RX ADMIN — CEFTRIAXONE 1 G: 1 INJECTION, SOLUTION INTRAVENOUS at 08:03

## 2019-03-13 RX ADMIN — DEXTROSE: 5 SOLUTION INTRAVENOUS at 08:03

## 2019-03-13 RX ADMIN — HYDRALAZINE HYDROCHLORIDE 25 MG: 25 TABLET ORAL at 05:03

## 2019-03-13 RX ADMIN — LABETALOL HYDROCHLORIDE 300 MG: 100 TABLET, FILM COATED ORAL at 12:03

## 2019-03-13 RX ADMIN — ENOXAPARIN SODIUM 30 MG: 100 INJECTION SUBCUTANEOUS at 12:03

## 2019-03-13 RX ADMIN — AMLODIPINE BESYLATE 10 MG: 5 TABLET ORAL at 08:03

## 2019-03-13 RX ADMIN — ERYTHROMYCIN: 5 OINTMENT OPHTHALMIC at 10:03

## 2019-03-13 NOTE — ED NOTES
Report received from JEFF Flores; pt resting in bed with eyes closed; pt non verbal at this time; VSS; no acute distress noted; will continue to monitor.

## 2019-03-13 NOTE — H&P
"[facility] Hospital Medicine  History & Physical    Patient Name: Nik Falk  MRN: [id number]  Admission Date: 3/12/2019  Attending Physician: Purnima Clarke MD   Primary Care Provider: Ronen Leong MD         Patient information was obtained from ER records.     Subjective:     Principal Problem:Acute cystitis    Chief Complaint: Confusion today.    HPI: Mrs. Nik Falk is a 87 y.o. female [facility] resident with essential hypertension, paroxysmal atrial fibrillation ([id number] score 6) on chronic anticoagulation, anemia of chronic disease, dementia, and history of CVA who presents to [facility] ED with complaints of confusion today.  Her daughter reports that she is usually able to talk but today has become nonverbal and nonambulatory, only moaning and writhing around.  EMS was activated and they measured her blood pressure to be 88/44 after which they transported her to this facility.  Further history is limited at this time.    Chart Review:  Previous Hospitalizations  Date Hospital Diagnosis   Dec 2018 [facility] Acute cystitis, acute renal failure, AFib with RVR   Sept 2015 [facility] Laparoscopic cholecystectomy      Past Medical History:   Diagnosis Date    Arthritis     Dementia     Hypertension     Renal disorder     Stroke        Past Surgical History:   Procedure Laterality Date    CHOLECYSTECTOMY-LAPAROSCOPIC N/A 9/25/2015    Performed by Cristian Escalera MD at [facility] OR       Review of patient's allergies indicates:   Allergen Reactions    Aspirin Other (See Comments)     Pt states " I get nervous"       No current facility-administered medications on file prior to encounter.      Current Outpatient Medications on File Prior to Encounter   Medication Sig    amlodipine (NORVASC) 10 MG tablet Take 10 mg by mouth once daily.    apixaban (ELIQUIS) 5 mg Tab Take 1 tablet (5 mg total) by mouth 2 (two) times daily.    aspirin (ECOTRIN) 81 " MG EC tablet Take 81 mg by mouth once daily.    brimonidine-timolol (COMBIGAN) 0.2-0.5 % Drop Place 1 drop into both eyes.    hydrALAZINE (APRESOLINE) 25 MG tablet Take 1 tablet (25 mg total) by mouth every 8 (eight) hours.    hydroCHLOROthiazide (HYDRODIURIL) 25 MG tablet Take 1 tablet (25 mg total) by mouth once daily.    labetalol (NORMODYNE) 300 MG tablet Take 1 tablet (300 mg total) by mouth every 12 (twelve) hours.    losartan (COZAAR) 100 MG tablet Take 100 mg by mouth once daily.     Family History     None        Tobacco Use    Smoking status: Former Smoker    Smokeless tobacco: Never Used   Substance and Sexual Activity    Alcohol use: No    Drug use: No    Sexual activity: Not Currently     Review of Systems   Unable to perform ROS: Mental status change     Objective:     Vital Signs (Most Recent):  Temp: 98.3 °F (36.8 °C) (03/13/19 0003)  Pulse: (!) 122 (03/13/19 0003)  Resp: 18 (03/13/19 0003)  BP: (!) 168/73 (03/13/19 0003)  SpO2: 95 % (03/13/19 0003) Vital Signs (24h Range):  Temp:  [98.1 °F (36.7 °C)-99 °F (37.2 °C)] 98.3 °F (36.8 °C)  Pulse:  [] 122  Resp:  [17-25] 18  SpO2:  [95 %-100 %] 95 %  BP: (122-168)/(55-97) 168/73     Weight: 64.8 kg (142 lb 13.7 oz)  Body mass index is 23.77 kg/m².    Physical Exam   Constitutional: She appears well-developed and well-nourished. No distress.   HENT:   Head: Normocephalic and atraumatic.   Right Ear: External ear normal.   Left Ear: External ear normal.   Nose: Nose normal.   Eyes: Right eye exhibits no discharge. Left eye exhibits no discharge.   Neck: Normal range of motion.   Cardiovascular: Normal rate, regular rhythm, normal heart sounds and intact distal pulses. Exam reveals no gallop and no friction rub.   No murmur heard.  Pulmonary/Chest: Effort normal and breath sounds normal. No stridor. No respiratory distress. She has no wheezes. She has no rales. She exhibits no tenderness.   Abdominal: Soft. Bowel sounds are normal. She  exhibits no distension. There is no tenderness. There is no rebound and no guarding.   Musculoskeletal: Normal range of motion. She exhibits no edema.   Neurological:   Agitated, responds purposefully but doesn't answer questions   Skin: Skin is warm and dry. She is not diaphoretic. No erythema.   Nursing note and vitals reviewed.          Significant Labs: All pertinent labs within the past 24 hours have been reviewed.    Significant Imaging: I have reviewed and interpreted all pertinent imaging results/findings within the past 24 hours.    Assessment/Plan:     * Acute cystitis    Patient's confusion is likely multifactorial to include uremia and sepsis due to acute cystitis.  Her urinalysis does appear infected with 2+ leukocytes, >100 WBCs, and many bacteria.  She does meet criteria for severe sepsis and has been started on empiric antibiotics pending blood and urine cultures.       Severe sepsis    This patient meets criteria for severe sepsis given tachypnea, leukocytosis, acute cystitis, acute renal failure, and encephalopathy.  Blood and urine cultures are pending; will start IV fluid hydration and broad spectrum antibiotics.     Acute renal failure    Patient's urinalysis is significant for a specific gravity of 1.015, cloudy appearance, 2+ protein, 1+ glucose, trace ketones, 1+ occult blood, and 2+ leukocytes.  Urine output has been poor.  Will obtain additional urine studies; provide aggressive IV fluid hydration; monitor the urine output; recheck the renal function in the morning; and avoid nephrotoxins.     Uremic encephalopathy    Etiology is likely due to both uremia and sepsis.  CT-head was negative for acute intracranial abnormalities.  I have reviewed the chest X-ray and it reveals no focal infiltrates or pleural effusions.  Patient is without fevers and meningismus.  Urinalysis was concerning for infection; serum glucose was 93 mg/dL; electrolytes are significant for hypernatremia; and there is  evidence of uremia.  There is no evidence of thyroidal disease; skin is intact; and there are no rashes.  Patient is hemodynamically-stable and there has not been recent medication changes.  Clinical suspicion of CVA or subclinical seizures are low.  Will plan to perform neurological testing every 4 hours with frequent re-orientation.  Will also minimize medications and place fall precautions.       Essential hypertension    Patient's blood pressure is poorly-controlled; will continue home regimen of amlodipine, hydralazine, and labetalol, and provide as-needed clonidine.  Will hold her home regimen of hydrochlorothiazide and losartan due to renal failure.     Paroxysmal atrial fibrillation    Patient currently is in bigeminy.  Will continue her home regimen of apixaban.     Chronic anticoagulation    As addressed above.     Anemia of chronic disease    The patient's H/H is stable and consistent with previous laboratory measurements, and the patient exhibits no signs or symptoms of acute bleeding; there is no indication for transfusion.  Will continue to monitor.     Vascular dementia without behavioral disturbance    Stable; there are no acute issues.     History of CVA (cerebrovascular accident)    Stable; will continue her home regimen of aspirin.       VTE Risk Mitigation (From admission, onward)        Ordered     apixaban tablet 2.5 mg  2 times daily      03/12/19 2347     IP VTE HIGH RISK PATIENT  Once      03/12/19 2341           Tara Webster M.D.  Staff Nocturnist  Department of Hospital Medicine  [facility]  Pager: [phone]

## 2019-03-13 NOTE — ASSESSMENT & PLAN NOTE
Patient's confusion is likely multifactorial to include uremia and sepsis due to acute cystitis.  Her urinalysis does appear infected with 2+ leukocytes, >100 WBCs, and many bacteria.  She does meet criteria for severe sepsis and has been started on empiric antibiotics pending blood and urine cultures.

## 2019-03-13 NOTE — ASSESSMENT & PLAN NOTE
Patient's blood pressure is poorly-controlled; will continue home regimen of amlodipine, hydralazine, and labetalol, and provide as-needed clonidine.  Will hold her home regimen of hydrochlorothiazide and losartan due to renal failure.

## 2019-03-13 NOTE — PROGRESS NOTES
Report received from off going nurse, JEFF Dupont. Patient resting with eyes closed. Rise and fall of chest noted. IVF infusing. No signs of distress noted. Call light in reach. Bed low and locked. Will continue to monitor.

## 2019-03-13 NOTE — ASSESSMENT & PLAN NOTE
Etiology is likely due to both uremia and sepsis.  CT-head was negative for acute intracranial abnormalities.  I have reviewed the chest X-ray and it reveals no focal infiltrates or pleural effusions.  Patient is without fevers and meningismus.  Urinalysis was concerning for infection; serum glucose was 93 mg/dL; electrolytes are significant for hypernatremia; and there is evidence of uremia.  There is no evidence of thyroidal disease; skin is intact; and there are no rashes.  Patient is hemodynamically-stable and there has not been recent medication changes.  Clinical suspicion of CVA or subclinical seizures are low.  Will plan to perform neurological testing every 4 hours with frequent re-orientation.  Will also minimize medications and place fall precautions.

## 2019-03-13 NOTE — NURSING
0002 -Patient arrived to floor via stretcher. Noted to have strong urine smell. Bath given by the PCT and myself. Patient came up with dempsey. Dark nora urine noted. Skin is intact. cardiac monitor applied. Ulna boots applied. Bed alarm set. Patient is oriented to self only and able to follow commands. Patient able to swallow pills crushed with apple sauce.     0030- Dr. Webster notified patient is bigemny. This was also noted in ER. No complaints or symptoms.

## 2019-03-13 NOTE — ASSESSMENT & PLAN NOTE
This patient meets criteria for severe sepsis given tachypnea, leukocytosis, acute cystitis, acute renal failure, and encephalopathy.  Blood and urine cultures are pending; will start IV fluid hydration and broad spectrum antibiotics.

## 2019-03-13 NOTE — PLAN OF CARE
03/13/19 1541   Discharge Assessment   Assessment Type Discharge Planning Assessment   Assessment information obtained from? Medical Record   Prior to hospitilization cognitive status: Not Oriented to Person;Not Oriented to Place;Not Oriented to Time   Prior to hospitalization functional status: Needs Assistance;Completely Dependent   Current cognitive status: Not Oriented to Person;Not Oriented to Place;Not Oriented to Time   Current Functional Status: Completely Dependent;Needs Assistance   Facility Arrived From: [facility] care home   Lives With facility resident   Able to Return to Prior Arrangements yes   Is patient able to care for self after discharge? No   Who are your caregiver(s) and their phone number(s)? Yamilexe- [phone]   Patient's perception of discharge disposition admitted as an inpatient   Readmission Within the Last 30 Days no previous admission in last 30 days   Patient currently being followed by outpatient case management? No   Patient currently receives any other outside agency services? No   Equipment Currently Used at Home wheelchair   Do you have any problems affording any of your prescribed medications? No   Is the patient taking medications as prescribed? yes   Does the patient have transportation home? Yes   Transportation Anticipated agency   Does the patient receive services at the [facility]? No   Discharge Plan A Return to nursing home  ([facility])   Discharge Plan B Return to Nursing Home   DME Needed Upon Discharge  none   Patient/Family in Agreement with Plan yes

## 2019-03-13 NOTE — PT/OT/SLP EVAL
Speech Language Pathology Evaluation  Bedside Swallow    Patient Name:  Nik Falk   MRN:  [id number]  Admitting Diagnosis: Acute cystitis    Recommendations:                 General Recommendations:  Dysphagia therapy  Diet recommendations:  Mechanical soft, Thin   Aspiration Precautions: ., 1 bite/sip at a time, Alternating bites/sips, Assistance with meals, Eliminate distractions, HOB to 90 degrees, Meds crushed in puree, Monitor for s/s of aspiration, Remain upright 30 minutes post meal, Small bites/sips and Standard aspiration precautions   General Precautions: Standard, aspiration, fall(mechanical soft; crush meds)  Communication strategies:  provide increased time to answer    History:     Past Medical History:   Diagnosis Date    Arthritis     Dementia     Hypertension     Renal disorder     Stroke        Past Surgical History:   Procedure Laterality Date    CHOLECYSTECTOMY-LAPAROSCOPIC N/A 9/25/2015    Performed by Cristian Escalera MD at [facility] OR       Social History: Patient lives at NH    Chest X-Rays: 3/12:  Lungs are symmetrically expanded.  No evidence of focal consolidative process, pneumothorax, or significant effusion.  No acute osseous abnormality identified    Prior diet: Unknown        Subjective     Pt asleep upon SLP arrival. Pt agreed to PO trials for assessment, Pt non-verbal during eval      Pain/Comfort:  · Pain Rating 1: 0/10    Objective:     Oral Musculature Evaluation  · Oral Musculature: unable to assess due to poor participation/comprehension  · Dentition: edentulous  · Voice Prior to PO Intake: non-verbal during eval    Bedside Swallow Eval:   Consistencies Assessed:  · Thin liquids via spoom & straw x 6 trials  · Puree x3 trials  · Soft solids x3 trials     Oral Phase:   · Decreased closure around utensil  · Prolonged mastication    Pharyngeal Phase:   · no overt clinical signs/symptoms of aspiration    Compensatory Strategies  · None    Treatment: Rec:mechanical  soft & thin liquids, crush meds; ST will follow for tx and diet tolerance    Assessment:     Nik Falk is a 87 y.o. female with a dx of Acute cystitis. Pt's swallow is functional for mechanical soft solids(chopped meat) and thin liquids. Pt with poor cognition 2/2 AMS. Unsure of pt's baseline cognitive status.   Goals:   Multidisciplinary Problems     SLP Goals        Problem: SLP Goal    Goal Priority Disciplines Outcome   SLP Goal    Low SLP Ongoing (interventions implemented as appropriate)   Description:  ST.Pt will participate in swallow eval to determine least restrictive diet with no overt s/s of aspiration.- GOAL Met 3/13  2. Pt will tolerate PO diet of mechanical soft & thin liquids with no overt s/s of aspiration.                    Plan:     · Patient to be seen:  3 x/week   · Plan of Care expires:  19  · Plan of Care reviewed with:  patient   · SLP Follow-Up:  Yes       Discharge recommendations:  nursing facility, basic   Barriers to Discharge:  None    Time Tracking:     SLP Treatment Date:   19  Speech Start Time:  1311  Speech Stop Time:  1326     Speech Total Time (min):  15 min    Billable Minutes: Eval Swallow and Oral Function 15 min    Jami Arboleda CCC-SLP  2019

## 2019-03-13 NOTE — CARE UPDATE
Ms. Falk is an 86 yo NH resident who presented with confusion and was found to have a UTI, sepsis, ARF, and metabolic derangements consistent with dehydration.  She was empirically started on ceftriaxone (recent culture with pan-sensitive E coli).  She was also started on IV fluids.  Mental started to improve as did renal function.  UCx with GNR.

## 2019-03-13 NOTE — SUBJECTIVE & OBJECTIVE
"Past Medical History:   Diagnosis Date    Arthritis     Dementia     Hypertension     Renal disorder     Stroke        Past Surgical History:   Procedure Laterality Date    CHOLECYSTECTOMY-LAPAROSCOPIC N/A 9/25/2015    Performed by Cristian Escalera MD at [facility] OR       Review of patient's allergies indicates:   Allergen Reactions    Aspirin Other (See Comments)     Pt states " I get nervous"       No current facility-administered medications on file prior to encounter.      Current Outpatient Medications on File Prior to Encounter   Medication Sig    amlodipine (NORVASC) 10 MG tablet Take 10 mg by mouth once daily.    apixaban (ELIQUIS) 5 mg Tab Take 1 tablet (5 mg total) by mouth 2 (two) times daily.    aspirin (ECOTRIN) 81 MG EC tablet Take 81 mg by mouth once daily.    brimonidine-timolol (COMBIGAN) 0.2-0.5 % Drop Place 1 drop into both eyes.    hydrALAZINE (APRESOLINE) 25 MG tablet Take 1 tablet (25 mg total) by mouth every 8 (eight) hours.    hydroCHLOROthiazide (HYDRODIURIL) 25 MG tablet Take 1 tablet (25 mg total) by mouth once daily.    labetalol (NORMODYNE) 300 MG tablet Take 1 tablet (300 mg total) by mouth every 12 (twelve) hours.    losartan (COZAAR) 100 MG tablet Take 100 mg by mouth once daily.     Family History     None        Tobacco Use    Smoking status: Former Smoker    Smokeless tobacco: Never Used   Substance and Sexual Activity    Alcohol use: No    Drug use: No    Sexual activity: Not Currently     Review of Systems   Unable to perform ROS: Mental status change     Objective:     Vital Signs (Most Recent):  Temp: 98.3 °F (36.8 °C) (03/13/19 0003)  Pulse: (!) 122 (03/13/19 0003)  Resp: 18 (03/13/19 0003)  BP: (!) 168/73 (03/13/19 0003)  SpO2: 95 % (03/13/19 0003) Vital Signs (24h Range):  Temp:  [98.1 °F (36.7 °C)-99 °F (37.2 °C)] 98.3 °F (36.8 °C)  Pulse:  [] 122  Resp:  [17-25] 18  SpO2:  [95 %-100 %] 95 %  BP: (122-168)/(55-97) 168/73     Weight: 64.8 kg (142 " lb 13.7 oz)  Body mass index is 23.77 kg/m².    Physical Exam   Constitutional: She appears well-developed and well-nourished. No distress.   HENT:   Head: Normocephalic and atraumatic.   Right Ear: External ear normal.   Left Ear: External ear normal.   Nose: Nose normal.   Eyes: Right eye exhibits no discharge. Left eye exhibits no discharge.   Neck: Normal range of motion.   Cardiovascular: Normal rate, regular rhythm, normal heart sounds and intact distal pulses. Exam reveals no gallop and no friction rub.   No murmur heard.  Pulmonary/Chest: Effort normal and breath sounds normal. No stridor. No respiratory distress. She has no wheezes. She has no rales. She exhibits no tenderness.   Abdominal: Soft. Bowel sounds are normal. She exhibits no distension. There is no tenderness. There is no rebound and no guarding.   Musculoskeletal: Normal range of motion. She exhibits no edema.   Neurological:   Agitated, responds purposefully but doesn't answer questions   Skin: Skin is warm and dry. She is not diaphoretic. No erythema.   Nursing note and vitals reviewed.          Significant Labs: All pertinent labs within the past 24 hours have been reviewed.    Significant Imaging: I have reviewed and interpreted all pertinent imaging results/findings within the past 24 hours.

## 2019-03-13 NOTE — ASSESSMENT & PLAN NOTE
Patient's urinalysis is significant for a specific gravity of 1.015, cloudy appearance, 2+ protein, 1+ glucose, trace ketones, 1+ occult blood, and 2+ leukocytes.  Urine output has been poor.  Will obtain additional urine studies; provide aggressive IV fluid hydration; monitor the urine output; recheck the renal function in the morning; and avoid nephrotoxins.

## 2019-03-13 NOTE — PLAN OF CARE
Problem: SLP Goal  Goal: SLP Goal  ST.Pt will participate in swallow eval to determine least restrictive diet with no overt s/s of aspiration.- GOAL Met 3/13  2. Pt will tolerate PO diet of mechanical soft & thin liquids with no overt s/s of aspiration.  Outcome: Ongoing (interventions implemented as appropriate)   ST: Swallow eval completed. Rec: mechanical soft diet & thin liquids, crush meds. ST will follow. Jami Arboleda CCC-SLP

## 2019-03-13 NOTE — HPI
Mrs. Nik Falk is a 87 y.o. female [facility] resident with essential hypertension, paroxysmal atrial fibrillation ([id number] score 6) on chronic anticoagulation, anemia of chronic disease, dementia, and history of CVA who presents to [facility] ED with complaints of confusion today.  Her daughter reports that she is usually able to talk but today has become nonverbal and nonambulatory, only moaning and writhing around.  EMS was activated and they measured her blood pressure to be 88/44 after which they transported her to this facility.  Further history is limited at this time.

## 2019-03-14 PROBLEM — H10.33 ACUTE BACTERIAL CONJUNCTIVITIS OF BOTH EYES: Status: ACTIVE | Noted: 2019-03-14

## 2019-03-14 LAB
ANION GAP SERPL CALC-SCNC: 6 MMOL/L
BASOPHILS # BLD AUTO: 0.02 K/UL
BASOPHILS NFR BLD: 0.2 %
BUN SERPL-MCNC: 75 MG/DL
CALCIUM SERPL-MCNC: 8.5 MG/DL
CHLORIDE SERPL-SCNC: 121 MMOL/L
CO2 SERPL-SCNC: 15 MMOL/L
CREAT SERPL-MCNC: 1.8 MG/DL
DIFFERENTIAL METHOD: ABNORMAL
EOSINOPHIL # BLD AUTO: 0.3 K/UL
EOSINOPHIL NFR BLD: 2.9 %
ERYTHROCYTE [DISTWIDTH] IN BLOOD BY AUTOMATED COUNT: 15.4 %
EST. GFR  (AFRICAN AMERICAN): 29 ML/MIN/1.73 M^2
EST. GFR  (NON AFRICAN AMERICAN): 25 ML/MIN/1.73 M^2
GLUCOSE SERPL-MCNC: 108 MG/DL
HCT VFR BLD AUTO: 25.1 %
HGB BLD-MCNC: 7.8 G/DL
LYMPHOCYTES # BLD AUTO: 1.8 K/UL
LYMPHOCYTES NFR BLD: 19.9 %
MAGNESIUM SERPL-MCNC: 1.9 MG/DL
MCH RBC QN AUTO: 31.8 PG
MCHC RBC AUTO-ENTMCNC: 31.1 G/DL
MCV RBC AUTO: 102 FL
MONOCYTES # BLD AUTO: 0.8 K/UL
MONOCYTES NFR BLD: 9.3 %
NEUTROPHILS # BLD AUTO: 6 K/UL
NEUTROPHILS NFR BLD: 67.7 %
PHOSPHATE SERPL-MCNC: 3.8 MG/DL
PLATELET # BLD AUTO: 226 K/UL
PMV BLD AUTO: 10 FL
POTASSIUM SERPL-SCNC: 3.8 MMOL/L
RBC # BLD AUTO: 2.45 M/UL
SODIUM SERPL-SCNC: 142 MMOL/L
WBC # BLD AUTO: 8.84 K/UL

## 2019-03-14 PROCEDURE — 80048 BASIC METABOLIC PNL TOTAL CA: CPT

## 2019-03-14 PROCEDURE — 92526 ORAL FUNCTION THERAPY: CPT

## 2019-03-14 PROCEDURE — 25000003 PHARM REV CODE 250: Performed by: EMERGENCY MEDICINE

## 2019-03-14 PROCEDURE — 83735 ASSAY OF MAGNESIUM: CPT

## 2019-03-14 PROCEDURE — 84100 ASSAY OF PHOSPHORUS: CPT

## 2019-03-14 PROCEDURE — 85025 COMPLETE CBC W/AUTO DIFF WBC: CPT

## 2019-03-14 PROCEDURE — 63600175 PHARM REV CODE 636 W HCPCS: Performed by: EMERGENCY MEDICINE

## 2019-03-14 PROCEDURE — 36415 COLL VENOUS BLD VENIPUNCTURE: CPT

## 2019-03-14 PROCEDURE — 25000003 PHARM REV CODE 250: Performed by: INTERNAL MEDICINE

## 2019-03-14 PROCEDURE — 21400001 HC TELEMETRY ROOM

## 2019-03-14 RX ADMIN — ERYTHROMYCIN 1 INCH: 5 OINTMENT OPHTHALMIC at 07:03

## 2019-03-14 RX ADMIN — APIXABAN 2.5 MG: 2.5 TABLET, FILM COATED ORAL at 10:03

## 2019-03-14 RX ADMIN — TIMOLOL MALEATE 1 DROP: 5 SOLUTION OPHTHALMIC at 02:03

## 2019-03-14 RX ADMIN — DEXTROSE: 5 SOLUTION INTRAVENOUS at 05:03

## 2019-03-14 RX ADMIN — ERYTHROMYCIN: 5 OINTMENT OPHTHALMIC at 10:03

## 2019-03-14 RX ADMIN — ERYTHROMYCIN 1 INCH: 5 OINTMENT OPHTHALMIC at 09:03

## 2019-03-14 RX ADMIN — BRIMONIDINE TARTRATE 1 DROP: 2 SOLUTION OPHTHALMIC at 01:03

## 2019-03-14 RX ADMIN — ASPIRIN 81 MG: 81 TABLET, COATED ORAL at 09:03

## 2019-03-14 RX ADMIN — ERYTHROMYCIN: 5 OINTMENT OPHTHALMIC at 01:03

## 2019-03-14 RX ADMIN — HYDRALAZINE HYDROCHLORIDE 25 MG: 25 TABLET ORAL at 05:03

## 2019-03-14 RX ADMIN — ACETAMINOPHEN 650 MG: 325 TABLET, FILM COATED ORAL at 02:03

## 2019-03-14 RX ADMIN — OLANZAPINE 5 MG: 5 TABLET, ORALLY DISINTEGRATING ORAL at 10:03

## 2019-03-14 RX ADMIN — TIMOLOL MALEATE 1 DROP: 5 SOLUTION OPHTHALMIC at 01:03

## 2019-03-14 RX ADMIN — AMLODIPINE BESYLATE 10 MG: 5 TABLET ORAL at 09:03

## 2019-03-14 RX ADMIN — ERYTHROMYCIN 1 INCH: 5 OINTMENT OPHTHALMIC at 02:03

## 2019-03-14 RX ADMIN — POLYETHYLENE GLYCOL 3350 17 G: 17 POWDER, FOR SOLUTION ORAL at 09:03

## 2019-03-14 RX ADMIN — CEFTRIAXONE 1 G: 1 INJECTION, SOLUTION INTRAVENOUS at 10:03

## 2019-03-14 RX ADMIN — LABETALOL HYDROCHLORIDE 300 MG: 100 TABLET, FILM COATED ORAL at 09:03

## 2019-03-14 RX ADMIN — BRIMONIDINE TARTRATE 1 DROP: 2 SOLUTION OPHTHALMIC at 02:03

## 2019-03-14 RX ADMIN — ERYTHROMYCIN: 5 OINTMENT OPHTHALMIC at 06:03

## 2019-03-14 RX ADMIN — APIXABAN 2.5 MG: 2.5 TABLET, FILM COATED ORAL at 09:03

## 2019-03-14 RX ADMIN — OLANZAPINE 5 MG: 5 TABLET, ORALLY DISINTEGRATING ORAL at 09:03

## 2019-03-14 NOTE — HOSPITAL COURSE
Ms. Falk is an 86 yo NH resident who presented with confusion and was found to have a UTI, sepsis, ARF, and metabolic derangements consistent with dehydration.  She was empirically started on ceftriaxone (recent culture with pan-sensitive E coli).  She was also started on IV fluids.  Mental started to improve as did renal function and metabolic derangements.  UCx with Proteus mirabilis and also E coli - both pan-sensitive and abx changed to PO Bactrim.  PT/OT assessed the patient during admission and she was felt to be at her prior level of function. She was stable to return to her NH to complete a course of PO antibiotics with PO Bactrim. Plan discussed with her daughter, Yamilex, on day of discharge. SW arranged for abx and return to her NH.

## 2019-03-14 NOTE — PROGRESS NOTES
[facility] Hospital Medicine  Progress Note    Patient Name: Nik Falk  MRN: [id number]  Patient Class: IP- Inpatient   Admission Date: 3/12/2019  Length of Stay: 2 days  Attending Physician: Purnima Clarke MD  Primary Care Provider: Ronen Leong MD        Subjective:     Principal Problem:Acute cystitis    HPI:  Mrs. Nik Falk is a 87 y.o. female [facility] resident with essential hypertension, paroxysmal atrial fibrillation ([id number] score 6) on chronic anticoagulation, anemia of chronic disease, dementia, and history of CVA who presents to [facility] ED with complaints of confusion today.  Her daughter reports that she is usually able to talk but today has become nonverbal and nonambulatory, only moaning and writhing around.  EMS was activated and they measured her blood pressure to be 88/44 after which they transported her to this facility.  Further history is limited at this time.    Hospital Course:  Ms. Falk is an 88 yo NH resident who presented with confusion and was found to have a UTI, sepsis, ARF, and metabolic derangements consistent with dehydration.  She was empirically started on ceftriaxone (recent culture with pan-sensitive E coli).  She was also started on IV fluids.  Mental started to improve as did renal function.  UCx with Proteus mirabilis and also an additional GNR.    Interval History:  Much more alert. Only complaint is shoulder pain.    Review of Systems   Constitutional: Negative for chills and fever.   Eyes: Positive for discharge. Negative for redness.   Respiratory: Negative for cough and shortness of breath.    Cardiovascular: Negative for chest pain and leg swelling.   Gastrointestinal: Negative for nausea and vomiting.     Objective:     Vital Signs (Most Recent):  Temp: 97.4 °F (36.3 °C) (03/14/19 1525)  Pulse: (!) 52 (03/14/19 1525)  Resp: 18 (03/14/19 1525)  BP: 126/63 (03/14/19 1525)  SpO2: 98 % (03/14/19 1525) Vital  Signs (24h Range):  Temp:  [97.4 °F (36.3 °C)-97.6 °F (36.4 °C)] 97.4 °F (36.3 °C)  Pulse:  [52-54] 52  Resp:  [14-18] 18  SpO2:  [98 %-100 %] 98 %  BP: (113-147)/(55-67) 126/63     Weight: 65.2 kg (143 lb 11.8 oz)  Body mass index is 23.92 kg/m².    Physical Exam   Constitutional: She appears well-developed and well-nourished. No distress.   HENT:   Right Ear: External ear normal.   Left Ear: External ear normal.   Nose: Nose normal.   Mouth/Throat: Oropharynx is clear and moist.   Eyes: Right eye exhibits no discharge. Left eye exhibits no discharge.   Previous eye discharged resolved   Neck: Normal range of motion. Neck supple.   Cardiovascular: Normal rate and normal heart sounds. Exam reveals no gallop and no friction rub.   No murmur heard.  Pulmonary/Chest: Effort normal and breath sounds normal. No stridor. No respiratory distress. She has no wheezes. She has no rales. She exhibits no tenderness.   Abdominal: Soft. Bowel sounds are normal. She exhibits no distension. There is no tenderness. There is no rebound and no guarding.   Musculoskeletal: Normal range of motion. She exhibits no edema.   Neurological:   Answers most questions appropriately with some prompting.   Skin: Skin is warm and dry. She is not diaphoretic. No erythema.   Nursing note and vitals reviewed.          Significant Labs: All pertinent labs within the past 24 hours have been reviewed.    Significant Imaging: I have reviewed and interpreted all pertinent imaging results/findings within the past 24 hours.    Assessment/Plan:      * Acute cystitis    UA consistent with UTI. Empiric ceftriaxone until culture results. Met criteria for severe sepsis on admission. UCx pending.     Acute bacterial conjunctivitis of both eyes    Purulent drainage from both eyes noted the morning following admission. Improved with erythromycin ointment.     History of CVA (cerebrovascular accident)    Stable; will continue her home regimen of aspirin.     Anemia  of chronic disease    The patient's H/H is stable and consistent with previous laboratory measurements, and the patient exhibits no signs or symptoms of acute bleeding; there is no indication for transfusion.  Will continue to monitor.     Chronic anticoagulation    As addressed above.     Uremic encephalopathy    Etiology is likely due to both uremia and sepsis.  CT-head was negative for acute intracranial abnormalities.  CXR with no focal infiltrates or pleural effusions.  Patient is without fevers and meningismus.  Urinalysis was concerning for infection; serum glucose was 93 mg/dL; electrolytes are significant for hypernatremia; and there is evidence of uremia.  There is no evidence of thyroidal disease; skin is intact; and there are no rashes.  Patient is hemodynamically-stable and there has not been recent medication changes.  Clinical suspicion of CVA or subclinical seizures are low.    Improved with resolution of uremia and UTI.     Acute renal failure    Patient's urinalysis is significant for a specific gravity of 1.015, cloudy appearance, 2+ protein, 1+ glucose, trace ketones, 1+ occult blood, and 2+ leukocytes.  Urine was been poor.  Improved with IV fluids.     Severe sepsis    This patient met criteria for severe sepsis given tachypnea, leukocytosis, acute cystitis, acute renal failure, and encephalopathy.  Blood and urine cultures are collected. IV fluid hydration and broad spectrum antibiotics.     Paroxysmal atrial fibrillation    Patient currently is in bigeminy.  Will continue her home regimen of apixaban.     Vascular dementia without behavioral disturbance    Stable; there are no acute issues.     Essential hypertension    Patient's blood pressure is poorly-controlled; will continue home regimen of amlodipine, hydralazine, and labetalol, and provide as-needed clonidine.  Will hold her home regimen of hydrochlorothiazide and losartan due to renal failure.       VTE Risk Mitigation (From  admission, onward)        Ordered     apixaban tablet 2.5 mg  2 times daily      03/12/19 2347     IP VTE HIGH RISK PATIENT  Once      03/12/19 2347              Purnima Clarke MD  Department of Hospital Medicine   [facility]

## 2019-03-14 NOTE — PT/OT/SLP PROGRESS
Speech Language Pathology Treatment    Patient Name:  Nik Falk   MRN:  [id number]  Admitting Diagnosis: Acute cystitis    Recommendations:                 General Recommendations:  Follow-up not indicated  Diet recommendations:  Mechanical soft, Liquid Diet Level: Thin   Aspiration Precautions: Standard aspiration precautions   General Precautions: Standard, aspiration  Communication strategies:  none    Subjective     Patient seen at bedside; family present.  Patient goals: n/a     Pain/Comfort:  ·      Objective:     Has the patient been evaluated by SLP for swallowing?   Yes  Keep patient NPO? No   Current Respiratory Status: room air      Patient tolerating soft diet with thin liquids well, yet intake fair.  Chest xray 3/12/19 shows lungs clear.    Assessment:     Nik Falk is a 87 y.o. female with an SLP diagnosis of Dysphagia.  She presents with functional oral motor and pharyngeal stage swallow skills for current diet.    Goals:   Multidisciplinary Problems     SLP Goals     Not on file          Multidisciplinary Problems (Resolved)        Problem: SLP Goal    Goal Priority Disciplines Outcome   SLP Goal   (Resolved)    Low SLP Outcome(s) achieved   Description:  ST.Pt will participate in swallow eval to determine least restrictive diet with no overt s/s of aspiration.- GOAL Met 3/13  2. Pt will tolerate PO diet of mechanical soft & thin liquids with no overt s/s of aspiration.                    Plan:     · Patient to be seen:  3 x/week   · Plan of Care expires:  19  · Plan of Care reviewed with:  patient, daughter   · SLP Follow-Up:  No       Discharge recommendations:  nursing facility, basic   Barriers to Discharge:  None    Time Tracking:     SLP Treatment Date:   19  Speech Start Time:  0945  Speech Stop Time:  1000     Speech Total Time (min):  15 min    Billable Minutes: Treatment Swallowing Dysfunction 15 minutes    Jolynn Murillo CCC-SLP  2019

## 2019-03-14 NOTE — ASSESSMENT & PLAN NOTE
Patient's urinalysis is significant for a specific gravity of 1.015, cloudy appearance, 2+ protein, 1+ glucose, trace ketones, 1+ occult blood, and 2+ leukocytes.  Urine was been poor.  Improved with IV fluids.

## 2019-03-14 NOTE — ASSESSMENT & PLAN NOTE
This patient met criteria for severe sepsis given tachypnea, leukocytosis, acute cystitis, acute renal failure, and encephalopathy.  Blood and urine cultures are collected. IV fluid hydration and broad spectrum antibiotics.

## 2019-03-14 NOTE — PHYSICIAN QUERY
PT Name: Nik Falk  MR #: [id number]    Physician Query Form - Cause and Effect Relationship Clarification      CDS/: ROBERTA Rivera, RN, CCDS               Contact information: [email or web address]    This form is a permanent document in the medical record.     Query Date: March 14, 2019    By submitting this query, we are merely seeking further clarification of documentation. Please utilize your independent clinical judgment when addressing the question(s) below.    The Medical record contains the following:  Supporting Clinical Findings   Location in record       Urine culture: PROTEUS MIRABILIS                  presented with confusion and was found to have a UTI, sepsis, ARF, and metabolic derangements consistent with dehydration.  She was empirically started on ceftriaxone (recent culture with pan-sensitive E coli).  She was also started on IV fluids.  Mental started to improve as did renal function.  UCx with GNR.          Acute cystitis     Patient's confusion is likely multifactorial to include uremia and sepsis due to acute cystitis.  Her urinalysis does appear infected with 2+ leukocytes, >100 WBCs, and many bacteria     Severe sepsis     This patient meets criteria for severe sepsis given tachypnea, leukocytosis, acute cystitis, acute renal failure, and encephalopathy.  Blood and urine cultures are pending; will start IV fluid hydration and broad spectrum antibiotics                                                                                                                                                                               Lab: 3/12    PN: 3/13        H & P: 3/13                                                                                                                                                                                                       Provider, please clarify if there is any correlation between ___PROTEUS MIRABILIS ________ and ____Severe  sepsis______________.           Are the conditions:      [ x ] Due to or associated with each other   [  ] Unrelated to each other   [  ] Other (Please Specify): _________________________   [  ] Clinically Undetermined

## 2019-03-14 NOTE — PROGRESS NOTES
Report received from off going nurse, JEFF Dupont. Patient resting with eyes closed. Rise and fall of chest noted. No signs of distress noted. IVF infusing. Call light in reach. Bed low and locked. Will continue to monitor.

## 2019-03-14 NOTE — PLAN OF CARE
Problem: Skin Injury Risk Increased  Goal: Skin Health and Integrity  Outcome: Ongoing (interventions implemented as appropriate)  Intervention: Optimize Skin Protection   03/14/19 1740   Prevent Additional Skin Injury   Head of Bed (HOB) HOB at 45 degrees   Pressure Reduction Devices pressure-redistributing mattress utilized   Pressure Reduction Techniques weight shift assistance provided   Monitor and Manage Hypervolemia   Skin Protection incontinence pads utilized     Intervention: Promote and Optimize Oral Intake   03/14/19 1740   Monitor and Manage Anemia   Oral Nutrition Promotion rest periods promoted

## 2019-03-14 NOTE — ASSESSMENT & PLAN NOTE
UA consistent with UTI. Empiric ceftriaxone until culture results. Met criteria for severe sepsis on admission. UCx pending.

## 2019-03-14 NOTE — ASSESSMENT & PLAN NOTE
Etiology is likely due to both uremia and sepsis.  CT-head was negative for acute intracranial abnormalities.  CXR with no focal infiltrates or pleural effusions.  Patient is without fevers and meningismus.  Urinalysis was concerning for infection; serum glucose was 93 mg/dL; electrolytes are significant for hypernatremia; and there is evidence of uremia.  There is no evidence of thyroidal disease; skin is intact; and there are no rashes.  Patient is hemodynamically-stable and there has not been recent medication changes.  Clinical suspicion of CVA or subclinical seizures are low.    Improved with resolution of uremia and UTI.

## 2019-03-14 NOTE — ASSESSMENT & PLAN NOTE
Purulent drainage from both eyes noted the morning following admission. Improved with erythromycin ointment.

## 2019-03-14 NOTE — PLAN OF CARE
Problem: SLP Goal  Goal: SLP Goal  ST.Pt will participate in swallow eval to determine least restrictive diet with no overt s/s of aspiration.- GOAL Met 3/13  2. Pt will tolerate PO diet of mechanical soft & thin liquids with no overt s/s of aspiration.   Outcome: Outcome(s) achieved Date Met: 19  Patient tolerating a soft diet with thin liquids well, yet intake fair.

## 2019-03-14 NOTE — SUBJECTIVE & OBJECTIVE
Interval History:  Much more alert. Only complaint is shoulder pain.    Review of Systems   Constitutional: Negative for chills and fever.   Eyes: Positive for discharge. Negative for redness.   Respiratory: Negative for cough and shortness of breath.    Cardiovascular: Negative for chest pain and leg swelling.   Gastrointestinal: Negative for nausea and vomiting.     Objective:     Vital Signs (Most Recent):  Temp: 97.4 °F (36.3 °C) (03/14/19 1525)  Pulse: (!) 52 (03/14/19 1525)  Resp: 18 (03/14/19 1525)  BP: 126/63 (03/14/19 1525)  SpO2: 98 % (03/14/19 1525) Vital Signs (24h Range):  Temp:  [97.4 °F (36.3 °C)-97.6 °F (36.4 °C)] 97.4 °F (36.3 °C)  Pulse:  [52-54] 52  Resp:  [14-18] 18  SpO2:  [98 %-100 %] 98 %  BP: (113-147)/(55-67) 126/63     Weight: 65.2 kg (143 lb 11.8 oz)  Body mass index is 23.92 kg/m².    Physical Exam   Constitutional: She appears well-developed and well-nourished. No distress.   HENT:   Right Ear: External ear normal.   Left Ear: External ear normal.   Nose: Nose normal.   Mouth/Throat: Oropharynx is clear and moist.   Eyes: Right eye exhibits no discharge. Left eye exhibits no discharge.   Previous eye discharged resolved   Neck: Normal range of motion. Neck supple.   Cardiovascular: Normal rate and normal heart sounds. Exam reveals no gallop and no friction rub.   No murmur heard.  Pulmonary/Chest: Effort normal and breath sounds normal. No stridor. No respiratory distress. She has no wheezes. She has no rales. She exhibits no tenderness.   Abdominal: Soft. Bowel sounds are normal. She exhibits no distension. There is no tenderness. There is no rebound and no guarding.   Musculoskeletal: Normal range of motion. She exhibits no edema.   Neurological:   Answers most questions appropriately with some prompting.   Skin: Skin is warm and dry. She is not diaphoretic. No erythema.   Nursing note and vitals reviewed.          Significant Labs: All pertinent labs within the past 24 hours have been  reviewed.    Significant Imaging: I have reviewed and interpreted all pertinent imaging results/findings within the past 24 hours.

## 2019-03-14 NOTE — PROGRESS NOTES
Patient resting with eyes closed resting comfortably. No signs of distress observed. bed low and locked. Call light in reach. Report given to oncoming nurse, JEFF Dupont. 12hour chart check complete.

## 2019-03-14 NOTE — NURSING
Report given to day shift nurse, ARI Schreiber. Patient is resting in bed. Fall precautions are in place.

## 2019-03-15 PROBLEM — A41.9 SEVERE SEPSIS: Status: RESOLVED | Noted: 2019-03-13 | Resolved: 2019-03-15

## 2019-03-15 PROBLEM — G93.49 UREMIC ENCEPHALOPATHY: Status: RESOLVED | Noted: 2019-03-13 | Resolved: 2019-03-15

## 2019-03-15 PROBLEM — N19 UREMIC ENCEPHALOPATHY: Status: RESOLVED | Noted: 2019-03-13 | Resolved: 2019-03-15

## 2019-03-15 PROBLEM — R65.20 SEVERE SEPSIS: Status: RESOLVED | Noted: 2019-03-13 | Resolved: 2019-03-15

## 2019-03-15 LAB
ANION GAP SERPL CALC-SCNC: 8 MMOL/L
BASOPHILS # BLD AUTO: 0.01 K/UL
BASOPHILS NFR BLD: 0.1 %
BUN SERPL-MCNC: 60 MG/DL
CALCIUM SERPL-MCNC: 8.6 MG/DL
CHLORIDE SERPL-SCNC: 119 MMOL/L
CO2 SERPL-SCNC: 16 MMOL/L
CREAT SERPL-MCNC: 1.4 MG/DL
DIFFERENTIAL METHOD: ABNORMAL
EOSINOPHIL # BLD AUTO: 0.2 K/UL
EOSINOPHIL NFR BLD: 2.9 %
ERYTHROCYTE [DISTWIDTH] IN BLOOD BY AUTOMATED COUNT: 15 %
EST. GFR  (AFRICAN AMERICAN): 39 ML/MIN/1.73 M^2
EST. GFR  (NON AFRICAN AMERICAN): 34 ML/MIN/1.73 M^2
GLUCOSE SERPL-MCNC: 92 MG/DL
HCT VFR BLD AUTO: 25.2 %
HGB BLD-MCNC: 8 G/DL
LYMPHOCYTES # BLD AUTO: 2.2 K/UL
LYMPHOCYTES NFR BLD: 26.7 %
MAGNESIUM SERPL-MCNC: 1.8 MG/DL
MCH RBC QN AUTO: 31.1 PG
MCHC RBC AUTO-ENTMCNC: 31.7 G/DL
MCV RBC AUTO: 98 FL
MONOCYTES # BLD AUTO: 0.8 K/UL
MONOCYTES NFR BLD: 9.5 %
NEUTROPHILS # BLD AUTO: 5 K/UL
NEUTROPHILS NFR BLD: 60.8 %
PHOSPHATE SERPL-MCNC: 3.6 MG/DL
PLATELET # BLD AUTO: 259 K/UL
PMV BLD AUTO: 10.2 FL
POTASSIUM SERPL-SCNC: 3.7 MMOL/L
RBC # BLD AUTO: 2.57 M/UL
SODIUM SERPL-SCNC: 143 MMOL/L
WBC # BLD AUTO: 8.25 K/UL

## 2019-03-15 PROCEDURE — 25000003 PHARM REV CODE 250: Performed by: INTERNAL MEDICINE

## 2019-03-15 PROCEDURE — 21400001 HC TELEMETRY ROOM

## 2019-03-15 PROCEDURE — 84100 ASSAY OF PHOSPHORUS: CPT

## 2019-03-15 PROCEDURE — 25000003 PHARM REV CODE 250: Performed by: EMERGENCY MEDICINE

## 2019-03-15 PROCEDURE — 85025 COMPLETE CBC W/AUTO DIFF WBC: CPT

## 2019-03-15 PROCEDURE — 97162 PT EVAL MOD COMPLEX 30 MIN: CPT

## 2019-03-15 PROCEDURE — 97165 OT EVAL LOW COMPLEX 30 MIN: CPT

## 2019-03-15 PROCEDURE — 36415 COLL VENOUS BLD VENIPUNCTURE: CPT

## 2019-03-15 PROCEDURE — 83735 ASSAY OF MAGNESIUM: CPT

## 2019-03-15 PROCEDURE — 80048 BASIC METABOLIC PNL TOTAL CA: CPT

## 2019-03-15 RX ORDER — SULFAMETHOXAZOLE AND TRIMETHOPRIM 800; 160 MG/1; MG/1
1 TABLET ORAL 2 TIMES DAILY
Status: DISCONTINUED | OUTPATIENT
Start: 2019-03-15 | End: 2019-03-16 | Stop reason: HOSPADM

## 2019-03-15 RX ADMIN — HYDRALAZINE HYDROCHLORIDE 25 MG: 25 TABLET ORAL at 02:03

## 2019-03-15 RX ADMIN — OLANZAPINE 5 MG: 5 TABLET, ORALLY DISINTEGRATING ORAL at 09:03

## 2019-03-15 RX ADMIN — ASPIRIN 81 MG: 81 TABLET, COATED ORAL at 09:03

## 2019-03-15 RX ADMIN — APIXABAN 2.5 MG: 2.5 TABLET, FILM COATED ORAL at 09:03

## 2019-03-15 RX ADMIN — SULFAMETHOXAZOLE AND TRIMETHOPRIM 1 TABLET: 800; 160 TABLET ORAL at 09:03

## 2019-03-15 RX ADMIN — AMLODIPINE BESYLATE 10 MG: 5 TABLET ORAL at 09:03

## 2019-03-15 RX ADMIN — POLYETHYLENE GLYCOL 3350 17 G: 17 POWDER, FOR SOLUTION ORAL at 09:03

## 2019-03-15 RX ADMIN — ERYTHROMYCIN: 5 OINTMENT OPHTHALMIC at 06:03

## 2019-03-15 RX ADMIN — TIMOLOL MALEATE 1 DROP: 5 SOLUTION OPHTHALMIC at 02:03

## 2019-03-15 RX ADMIN — LABETALOL HYDROCHLORIDE 300 MG: 100 TABLET, FILM COATED ORAL at 09:03

## 2019-03-15 RX ADMIN — BRIMONIDINE TARTRATE 1 DROP: 2 SOLUTION OPHTHALMIC at 02:03

## 2019-03-15 RX ADMIN — ERYTHROMYCIN: 5 OINTMENT OPHTHALMIC at 09:03

## 2019-03-15 RX ADMIN — TIMOLOL MALEATE 1 DROP: 5 SOLUTION OPHTHALMIC at 04:03

## 2019-03-15 RX ADMIN — BRIMONIDINE TARTRATE 1 DROP: 2 SOLUTION OPHTHALMIC at 04:03

## 2019-03-15 RX ADMIN — ERYTHROMYCIN: 5 OINTMENT OPHTHALMIC at 02:03

## 2019-03-15 RX ADMIN — HYDRALAZINE HYDROCHLORIDE 25 MG: 25 TABLET ORAL at 09:03

## 2019-03-15 RX ADMIN — ERYTHROMYCIN: 5 OINTMENT OPHTHALMIC at 04:03

## 2019-03-15 NOTE — PROGRESS NOTES
[facility] Hospital Medicine  Progress Note    Patient Name: Nik Falk  MRN: [id number]  Patient Class: IP- Inpatient   Admission Date: 3/12/2019  Length of Stay: 3 days  Attending Physician: Purnima Clarke MD  Primary Care Provider: Ronen Leong MD        Subjective:     Principal Problem:Acute cystitis    HPI:  Mrs. Nik Falk is a 87 y.o. female [facility] resident with essential hypertension, paroxysmal atrial fibrillation ([id number] score 6) on chronic anticoagulation, anemia of chronic disease, dementia, and history of CVA who presents to [facility] ED with complaints of confusion today.  Her daughter reports that she is usually able to talk but today has become nonverbal and nonambulatory, only moaning and writhing around.  EMS was activated and they measured her blood pressure to be 88/44 after which they transported her to this facility.  Further history is limited at this time.    Hospital Course:  Ms. Falk is an 88 yo NH resident who presented with confusion and was found to have a UTI, sepsis, ARF, and metabolic derangements consistent with dehydration.  She was empirically started on ceftriaxone (recent culture with pan-sensitive E coli).  She was also started on IV fluids.  Mental started to improve as did renal function.  UCx with Proteus mirabilis and also E coli - both pan-sensitive and abx changed to PO Bactrim.  PT/OT assessed the patient during admission.     Interval History:  Much more alert. Oriented to name and date of birth but not year. Does not know she is in the hospital and cannot tell me the year.    Review of Systems   Constitutional: Negative for chills and fever.   Eyes: Negative for discharge and redness.   Respiratory: Negative for cough and shortness of breath.    Cardiovascular: Negative for chest pain and leg swelling.   Gastrointestinal: Negative for nausea and vomiting.     Objective:     Vital Signs (Most  Recent):  Temp: 97.8 °F (36.6 °C) (03/15/19 1514)  Pulse: (!) 49 (03/15/19 1514)  Resp: 18 (03/15/19 1514)  BP: (!) 163/74 (03/15/19 1514)  SpO2: 100 % (03/15/19 1514) Vital Signs (24h Range):  Temp:  [97.3 °F (36.3 °C)-98.3 °F (36.8 °C)] 97.8 °F (36.6 °C)  Pulse:  [49-95] 49  Resp:  [18] 18  SpO2:  [100 %] 100 %  BP: (106-174)/(58-80) 163/74     Weight: 67.6 kg (149 lb 0.5 oz)  Body mass index is 24.8 kg/m².    Physical Exam   Constitutional: She appears well-developed and well-nourished. No distress.   HENT:   Right Ear: External ear normal.   Left Ear: External ear normal.   Nose: Nose normal.   Mouth/Throat: Oropharynx is clear and moist.   Eyes: Right eye exhibits no discharge. Left eye exhibits no discharge.   Previous eye discharged resolved   Neck: Normal range of motion. Neck supple.   Cardiovascular: Normal rate and normal heart sounds. Exam reveals no gallop and no friction rub.   No murmur heard.  Pulmonary/Chest: Effort normal and breath sounds normal. No stridor. No respiratory distress. She has no wheezes. She has no rales. She exhibits no tenderness.   Abdominal: Soft. Bowel sounds are normal. She exhibits no distension. There is no tenderness. There is no rebound and no guarding.   Musculoskeletal: Normal range of motion. She exhibits no edema.   Neurological:   Answers simple questions but only oriented to name and date of birth but not year.   Skin: Skin is warm and dry. She is not diaphoretic. No erythema.   Nursing note and vitals reviewed.          Significant Labs: All pertinent labs within the past 24 hours have been reviewed.      Assessment/Plan:      * Acute cystitis    UA consistent with UTI. Empiric ceftriaxone until culture results. Met criteria for severe sepsis on admission. UCx E coli and Proteus mirabilis - pansensitive; abx changed to Bactrim.     Acute bacterial conjunctivitis of both eyes    Purulent drainage from both eyes noted the morning following admission. Improved with  erythromycin ointment.     History of CVA (cerebrovascular accident)    Stable; will continue her home regimen of aspirin.     Anemia of chronic disease    The patient's H/H is stable and consistent with previous laboratory measurements, and the patient exhibits no signs or symptoms of acute bleeding; there is no indication for transfusion.  Will continue to monitor.     Chronic anticoagulation    As addressed above.     Acute renal failure    Patient's urinalysis is significant for a specific gravity of 1.015, cloudy appearance, 2+ protein, 1+ glucose, trace ketones, 1+ occult blood, and 2+ leukocytes.  Urine was been poor.  Improved with IV fluids.     Paroxysmal atrial fibrillation    Patient currently is in bigeminy.  Will continue her home regimen of apixaban.     Vascular dementia without behavioral disturbance    Stable; there are no acute issues.     Essential hypertension    Patient's blood pressure is poorly-controlled; will continue home regimen of amlodipine, hydralazine, and labetalol, and provide as-needed clonidine.  Will hold her home regimen of hydrochlorothiazide and losartan due to renal failure.       VTE Risk Mitigation (From admission, onward)        Ordered     apixaban tablet 2.5 mg  2 times daily      03/12/19 0217     IP VTE HIGH RISK PATIENT  Once      03/12/19 1679              Purnima Clarke MD  Department of Hospital Medicine   [facility]

## 2019-03-15 NOTE — PT/OT/SLP EVAL
Occupational Therapy   Evaluation and Discharge Note    Name: Nik Falk  MRN: [id number]  Admitting Diagnosis:  Acute cystitis      Recommendations:     Discharge Recommendations: nursing facility, basic  Discharge Equipment Recommendations:  none  Barriers to discharge:  None    Assessment:     Nik Falk is a 87 y.o. female with a medical diagnosis of Acute cystitis. At this time, patient is functioning at their prior level of function and does not require further acute OT services.     Plan:     During this hospitalization, patient does not require further acute OT services.  Please re-consult if situation changes.    · Plan of Care Reviewed with: patient    Subjective     Chief Complaint: none  Patient/Family Comments/goals: unable to state    Occupational Profile:  Living Environment: [facility]  Previous level of function: Per chart, the patient is dependent far all self care and functional mobility.  Roles and Routines:  NH resident- The patient is confused and unable to state.  Equipment Used at home:  wheelchair  Assistance upon Discharge: NH staff    Pain/Comfort:  · Pain Rating 1: 0/10    Patients cultural, spiritual, Muslim conflicts given the current situation: no    Objective:     Communicated with: Candie yates prior to session.  Patient found left sidelying with bed alarm, peripheral IV, telemetry upon OT entry to room.    General Precautions: Standard, fall   Orthopedic Precautions:N/A   Braces: N/A     Occupational Performance:    Bed Mobility:    · Patient completed Rolling/Turning to Left with  maximal assistance  · Patient completed Rolling/Turning to Right with maximal assistance  · Patient completed Scooting/Bridging with dependent  · Patient completed Supine to Sit with total assistance and 2 persons  · Patient completed Sit to Supine with total assistance and 2 persons    Functional Mobility/Transfers:  · Patient completed Sit <> Stand Transfer with total  assistance and of 2 persons  with  hand-held assist   · Functional Mobility: The patient stood x2 with 2 person assist and was unable to achieve a full stand    Activities of Daily Living:  · Feeding:  minimum assistance to drink from a carton  · Upper Body Dressing: dependence    · Lower Body Dressing: dependence      Cognitive/Visual Perceptual:  Cognitive/Psychosocial Skills:     -       Oriented to: Person   -       Follows Commands/attention:Inattentive, Easily distracted and occassional follwed one step command  -       Communication: clear/fluent  -       Memory: Impaired STM, Impaired LTM and Poor immediate recall  -       Safety awareness/insight to disability: impaired   -       Mood/Affect/Coping skills/emotional control: Appropriate to situation    Physical Exam:  Balance: -       fair sitting  Postural examination/scapula alignment:    -       Rounded shoulders  -       Forward head  Skin integrity: Visible skin intact  Upper Extremity Range of Motion:     -       Right Upper Extremity: WFL  -       Left Upper Extremity: WFL  Upper Extremity Strength:    -       Right Upper Extremity: WFL  -       Left Upper Extremity: WFL    AMPAC 6 Click ADL:  AMPAC Total Score: 11    Treatment & Education:  The patient participated in OT eval. The patient was oriented to self only and could not provide a functional history.  Education:    Patient left right sidelying with all lines intact, call button in reach, bed alarm on and nurse notified    GOALS:   Multidisciplinary Problems     Occupational Therapy Goals     Not on file          Multidisciplinary Problems (Resolved)        Problem: Occupational Therapy Goal    Goal Priority Disciplines Outcome Interventions   Occupational Therapy Goal   (Resolved)     OT, PT/OT Outcome(s) achieved                    History:     Past Medical History:   Diagnosis Date    Arthritis     Dementia     Hypertension     Renal disorder     Stroke        Past Surgical History:    Procedure Laterality Date    CHOLECYSTECTOMY-LAPAROSCOPIC N/A 9/25/2015    Performed by Cristian Escalera MD at [facility] OR       Time Tracking:     OT Date of Treatment: 03/15/19  OT Start Time: 1057  OT Stop Time: 1114  OT Total Time (min): 17 min    Billable Minutes:Evaluation 17 (with PT)    Cat Esqueda, OT  3/15/2019

## 2019-03-15 NOTE — SUBJECTIVE & OBJECTIVE
Interval History:  Much more alert. Oriented to name and date of birth but not year. Does not know she is in the hospital and cannot tell me the year.    Review of Systems   Constitutional: Negative for chills and fever.   Eyes: Negative for discharge and redness.   Respiratory: Negative for cough and shortness of breath.    Cardiovascular: Negative for chest pain and leg swelling.   Gastrointestinal: Negative for nausea and vomiting.     Objective:     Vital Signs (Most Recent):  Temp: 97.8 °F (36.6 °C) (03/15/19 1514)  Pulse: (!) 49 (03/15/19 1514)  Resp: 18 (03/15/19 1514)  BP: (!) 163/74 (03/15/19 1514)  SpO2: 100 % (03/15/19 1514) Vital Signs (24h Range):  Temp:  [97.3 °F (36.3 °C)-98.3 °F (36.8 °C)] 97.8 °F (36.6 °C)  Pulse:  [49-95] 49  Resp:  [18] 18  SpO2:  [100 %] 100 %  BP: (106-174)/(58-80) 163/74     Weight: 67.6 kg (149 lb 0.5 oz)  Body mass index is 24.8 kg/m².    Physical Exam   Constitutional: She appears well-developed and well-nourished. No distress.   HENT:   Right Ear: External ear normal.   Left Ear: External ear normal.   Nose: Nose normal.   Mouth/Throat: Oropharynx is clear and moist.   Eyes: Right eye exhibits no discharge. Left eye exhibits no discharge.   Previous eye discharged resolved   Neck: Normal range of motion. Neck supple.   Cardiovascular: Normal rate and normal heart sounds. Exam reveals no gallop and no friction rub.   No murmur heard.  Pulmonary/Chest: Effort normal and breath sounds normal. No stridor. No respiratory distress. She has no wheezes. She has no rales. She exhibits no tenderness.   Abdominal: Soft. Bowel sounds are normal. She exhibits no distension. There is no tenderness. There is no rebound and no guarding.   Musculoskeletal: Normal range of motion. She exhibits no edema.   Neurological:   Answers simple questions but only oriented to name and date of birth but not year.   Skin: Skin is warm and dry. She is not diaphoretic. No erythema.   Nursing note and  vitals reviewed.          Significant Labs: All pertinent labs within the past 24 hours have been reviewed.

## 2019-03-15 NOTE — PROGRESS NOTES
Patient resting comfortably. No signs of distress observed. bed low and locked. Call light in reach. Report given to oncoming nurse, wilmer garza 12hour chart check complete.

## 2019-03-15 NOTE — PT/OT/SLP EVAL
Physical Therapy Evaluation and Discharge Note    Patient Name:  Nik Falk   MRN:  [id number]    Recommendations:     Discharge Recommendations:  (back to [facility])   Discharge Equipment Recommendations: none   Barriers to discharge: None    Assessment:     Nik Falk is a 87 y.o. female admitted with a medical diagnosis of Acute cystitis.  At this time, patient is functioning at their prior level of function and does not require further acute PT services.     Recent Surgery: * No surgery found *      Plan:     During this hospitalization, patient does not require further acute PT services.  Please re-consult if situation changes.      Subjective     Chief Complaint: Pt did not speak much.  Patient/Family Comments/goals: Pt did not state.   Pain/Comfort:  · Pain Rating 1: (Pt with RLE pain only with movement.)    Living Environment:  Pt lives at [facility] ~3 months.  Pt known to acute skilled PT services back in 12/2018, SNF was recommended at that time.  Pt now jail placement at NH, did not appeared to have made much progress.     Prior to admission, patients level of function was dependent per chart.  Equipment used at home: wheelchair.  Upon discharge, patient will have assistance from NH staff.    Objective:     Patient found in bed in fetal position upon PT entry to room found with: bed alarm, peripheral IV, telemetry, pressure relief boots     General Precautions: Standard, fall   Orthopedic Precautions:N/A   Braces: N/A     Exams:  · Cognitive Exam:  Patient is oriented to Person only.  Pt with limited verbal communication.  Pt was unable to follow simple commands.    · Gross Motor Coordination:  impaired  · Postural Exam:  Patient presented with the following abnormalities:    · -       Pt found in fetal position.  · Skin Integrity/Edema:      · -       Skin integrity: Visible skin intact  · -       Edema: None noted BLE  · RLE ROM: PROM WFL except decreased  R knee extension  · RLE Strength: unable to formally assess 2* pt unable to follow simple commands  · LLE ROM: PROM WFL  · LLE Strength: unable to formally assess 2* pt unable to follow simple commands    Functional Mobility:  · Bed Mobility:     · Rolling Left:  dependence  · Rolling Right: dependence  · Scooting: dependence and of 2 persons  · Bridging: dependence and of 2 persons  · Supine to Sit: dependence and of 2 persons  · Sit to Supine: dependence and of 2 persons  · Transfers:     · Sit to Stand:  dependence and of 2 persons with no AD x 2 trials; Pt was able to clear buttocks off EOB, but unable to achieve upright posture.  · Balance: Pt with fair sit balance and poor stand balance.     AM-PAC 6 CLICK MOBILITY  Total Score:9     Patient left right sidelying and HOB elevated with all lines intact, call button in reach and bed alarm on. Pressure relief boots placed back on.      GOALS:   Multidisciplinary Problems     Physical Therapy Goals     Not on file          Multidisciplinary Problems (Resolved)        Problem: Physical Therapy Goal    Goal Priority Disciplines Outcome Goal Variances Interventions   Physical Therapy Goal   (Resolved)     PT, PT/OT Outcome(s) achieved                     History:     Past Medical History:   Diagnosis Date    Arthritis     Dementia     Hypertension     Renal disorder     Stroke        Past Surgical History:   Procedure Laterality Date    CHOLECYSTECTOMY-LAPAROSCOPIC N/A 9/25/2015    Performed by Cristian Escalera MD at [facility] OR       Time Tracking:     PT Received On: 03/15/19  PT Start Time: 1058     PT Stop Time: 1114  PT Total Time (min): 16 min     Billable Minutes: Evaluation 16 min co-breanna [facility] OT      Rosanna Marrero, PT  03/15/2019

## 2019-03-15 NOTE — ASSESSMENT & PLAN NOTE
UA consistent with UTI. Empiric ceftriaxone until culture results. Met criteria for severe sepsis on admission. UCx E coli and Proteus mirabilis - pansensitive; abx changed to Bactrim.

## 2019-03-15 NOTE — PLAN OF CARE
Problem: Physical Therapy Goal  Goal: Physical Therapy Goal  Outcome: Outcome(s) achieved Date Met: 03/15/19  Pt dependent with functional mobility at [facility], no acute skilled PT services needed at this time.

## 2019-03-15 NOTE — PLAN OF CARE
Problem: Fall Injury Risk  Goal: Absence of Fall and Fall-Related Injury  Outcome: Ongoing (interventions implemented as appropriate)  Intervention: Identify and Manage Contributors to Fall Injury Risk   03/15/19 1752   Manage Acute Allergic Reaction   Medication Review/Management medications reviewed   Identify and Manage Contributors to Fall Injury Risk   Self-Care Promotion independence encouraged;BADL personal objects within reach;BADL personal routines maintained         Problem: Adult Inpatient Plan of Care  Goal: Plan of Care Review  Outcome: Ongoing (interventions implemented as appropriate)   03/15/19 1752   Plan of Care Review   Plan of Care Reviewed With patient       Problem: Skin Injury Risk Increased  Goal: Skin Health and Integrity  Outcome: Ongoing (interventions implemented as appropriate)  Intervention: Optimize Skin Protection   03/15/19 1752   Prevent Additional Skin Injury   Head of Bed (HOB) HOB elevated   Pressure Reduction Devices pressure-redistributing mattress utilized   Pressure Reduction Techniques frequent weight shift encouraged;weight shift assistance provided;pressure points protected   Monitor and Manage Hypervolemia   Skin Protection incontinence pads utilized         Problem: Infection  Goal: Infection Symptom Resolution  Outcome: Ongoing (interventions implemented as appropriate)  Intervention: Prevent or Manage Infection   03/15/19 1752   Prevent or Manage Infection   Infection Management aseptic technique maintained

## 2019-03-15 NOTE — PLAN OF CARE
Problem: Occupational Therapy Goal  Goal: Occupational Therapy Goal  Outcome: Outcome(s) achieved Date Met: 03/15/19  OT eval is complete. The patient is confused and oriented to self only and was unable to follow commands. No OT is recommended.    Comments: Nursing should encourage the patient to participate in self care tasks as able.

## 2019-03-16 VITALS
HEART RATE: 61 BPM | WEIGHT: 145.31 LBS | RESPIRATION RATE: 18 BRPM | OXYGEN SATURATION: 100 % | SYSTOLIC BLOOD PRESSURE: 146 MMHG | BODY MASS INDEX: 24.18 KG/M2 | TEMPERATURE: 98 F | DIASTOLIC BLOOD PRESSURE: 63 MMHG

## 2019-03-16 LAB
ANION GAP SERPL CALC-SCNC: 7 MMOL/L
BASOPHILS # BLD AUTO: 0.01 K/UL
BASOPHILS NFR BLD: 0.1 %
BUN SERPL-MCNC: 51 MG/DL
CALCIUM SERPL-MCNC: 8.8 MG/DL
CHLORIDE SERPL-SCNC: 119 MMOL/L
CO2 SERPL-SCNC: 17 MMOL/L
CREAT SERPL-MCNC: 1.4 MG/DL
DIFFERENTIAL METHOD: ABNORMAL
EOSINOPHIL # BLD AUTO: 0.2 K/UL
EOSINOPHIL NFR BLD: 2.3 %
ERYTHROCYTE [DISTWIDTH] IN BLOOD BY AUTOMATED COUNT: 15.2 %
EST. GFR  (AFRICAN AMERICAN): 39 ML/MIN/1.73 M^2
EST. GFR  (NON AFRICAN AMERICAN): 34 ML/MIN/1.73 M^2
GLUCOSE SERPL-MCNC: 90 MG/DL
HCT VFR BLD AUTO: 26.1 %
HGB BLD-MCNC: 8.2 G/DL
LYMPHOCYTES # BLD AUTO: 2.3 K/UL
LYMPHOCYTES NFR BLD: 25.8 %
MAGNESIUM SERPL-MCNC: 1.7 MG/DL
MCH RBC QN AUTO: 31.5 PG
MCHC RBC AUTO-ENTMCNC: 31.4 G/DL
MCV RBC AUTO: 100 FL
MONOCYTES # BLD AUTO: 1 K/UL
MONOCYTES NFR BLD: 11.8 %
NEUTROPHILS # BLD AUTO: 5.3 K/UL
NEUTROPHILS NFR BLD: 60 %
PHOSPHATE SERPL-MCNC: 3.2 MG/DL
PLATELET # BLD AUTO: 263 K/UL
PMV BLD AUTO: 10.2 FL
POTASSIUM SERPL-SCNC: 3.8 MMOL/L
RBC # BLD AUTO: 2.6 M/UL
SODIUM SERPL-SCNC: 143 MMOL/L
WBC # BLD AUTO: 8.81 K/UL

## 2019-03-16 PROCEDURE — 83735 ASSAY OF MAGNESIUM: CPT

## 2019-03-16 PROCEDURE — 84100 ASSAY OF PHOSPHORUS: CPT

## 2019-03-16 PROCEDURE — 25000003 PHARM REV CODE 250: Performed by: EMERGENCY MEDICINE

## 2019-03-16 PROCEDURE — 80048 BASIC METABOLIC PNL TOTAL CA: CPT

## 2019-03-16 PROCEDURE — 25000003 PHARM REV CODE 250: Performed by: INTERNAL MEDICINE

## 2019-03-16 PROCEDURE — 85025 COMPLETE CBC W/AUTO DIFF WBC: CPT

## 2019-03-16 PROCEDURE — 36415 COLL VENOUS BLD VENIPUNCTURE: CPT

## 2019-03-16 RX ORDER — LOSARTAN POTASSIUM 25 MG/1
100 TABLET ORAL DAILY
Status: DISCONTINUED | OUTPATIENT
Start: 2019-03-16 | End: 2019-03-16 | Stop reason: HOSPADM

## 2019-03-16 RX ORDER — ERYTHROMYCIN 5 MG/G
OINTMENT OPHTHALMIC EVERY 4 HOURS
Qty: 3.5 G | Refills: 0
Start: 2019-03-16 | End: 2019-03-23

## 2019-03-16 RX ORDER — HYDROCHLOROTHIAZIDE 25 MG/1
25 TABLET ORAL DAILY
Status: DISCONTINUED | OUTPATIENT
Start: 2019-03-16 | End: 2019-03-16 | Stop reason: HOSPADM

## 2019-03-16 RX ORDER — SULFAMETHOXAZOLE AND TRIMETHOPRIM 800; 160 MG/1; MG/1
1 TABLET ORAL 2 TIMES DAILY
Qty: 14 TABLET | Refills: 0
Start: 2019-03-16 | End: 2019-03-23

## 2019-03-16 RX ADMIN — POLYETHYLENE GLYCOL 3350 17 G: 17 POWDER, FOR SOLUTION ORAL at 09:03

## 2019-03-16 RX ADMIN — ASPIRIN 81 MG: 81 TABLET, COATED ORAL at 09:03

## 2019-03-16 RX ADMIN — OLANZAPINE 5 MG: 5 TABLET, ORALLY DISINTEGRATING ORAL at 09:03

## 2019-03-16 RX ADMIN — BRIMONIDINE TARTRATE 1 DROP: 2 SOLUTION OPHTHALMIC at 02:03

## 2019-03-16 RX ADMIN — ERYTHROMYCIN: 5 OINTMENT OPHTHALMIC at 02:03

## 2019-03-16 RX ADMIN — HYDROCHLOROTHIAZIDE 25 MG: 25 TABLET ORAL at 11:03

## 2019-03-16 RX ADMIN — LOSARTAN POTASSIUM 100 MG: 25 TABLET, FILM COATED ORAL at 11:03

## 2019-03-16 RX ADMIN — TIMOLOL MALEATE 1 DROP: 5 SOLUTION OPHTHALMIC at 02:03

## 2019-03-16 RX ADMIN — ERYTHROMYCIN: 5 OINTMENT OPHTHALMIC at 06:03

## 2019-03-16 RX ADMIN — HYDRALAZINE HYDROCHLORIDE 25 MG: 25 TABLET ORAL at 02:03

## 2019-03-16 RX ADMIN — SULFAMETHOXAZOLE AND TRIMETHOPRIM 1 TABLET: 800; 160 TABLET ORAL at 09:03

## 2019-03-16 RX ADMIN — LABETALOL HYDROCHLORIDE 300 MG: 100 TABLET, FILM COATED ORAL at 09:03

## 2019-03-16 RX ADMIN — HYDRALAZINE HYDROCHLORIDE 25 MG: 25 TABLET ORAL at 06:03

## 2019-03-16 RX ADMIN — ERYTHROMYCIN: 5 OINTMENT OPHTHALMIC at 09:03

## 2019-03-16 RX ADMIN — APIXABAN 2.5 MG: 2.5 TABLET, FILM COATED ORAL at 09:03

## 2019-03-16 RX ADMIN — AMLODIPINE BESYLATE 10 MG: 5 TABLET ORAL at 09:03

## 2019-03-16 NOTE — PROGRESS NOTES
ADT 30 order for  Transportation.    ETA: [facility] at 4:30 pm per stretcher.  If transportation does not arrive at ETA time nurse will be instructed to follow protocol for transportation below:   1.How can I get in touch directly with dispatch, if needed?                 2. Non-emergent dispatch: [phone]   3. Emergent dispatch: [phone]   4. Non-emergent (stretcher): [phone]    5. Escalation Needs ([facility] Lead): [phone]..Dee Tam, RN, BSN, STN CCM  3/16/2019        .

## 2019-03-16 NOTE — PLAN OF CARE
03/16/19 1501   Post-Acute Status   Post-Acute Authorization Placement  ([facility])   Post-Acute Placement Status Set-up Complete

## 2019-03-16 NOTE — NURSING
Attempted to call [facility] to give report, no answer. Will attempt again.

## 2019-03-16 NOTE — PLAN OF CARE
03/16/19 1459   Final Note   Assessment Type Final Discharge Note   Anticipated Discharge Disposition long-term Nu  ([facility])   What phone number can be called within the next 1-3 days to see how you are doing after discharge? ([phone] )   Hospital Follow Up  Appt(s) scheduled? No  (Facility to set up follow-ups)   Discharge plans and expectations educations in teach back method with documentation complete? Yes   Right Care Referral Info   Post Acute Recommendation Other   Referral Type Nursing Home   Facility Name [place], [place]

## 2019-03-16 NOTE — PLAN OF CARE
Problem: Fall Injury Risk  Goal: Absence of Fall and Fall-Related Injury  Outcome: Ongoing (interventions implemented as appropriate)  Intervention: Identify and Manage Contributors to Fall Injury Risk   03/16/19 1329   Manage Acute Allergic Reaction   Medication Review/Management medications reviewed   Identify and Manage Contributors to Fall Injury Risk   Self-Care Promotion independence encouraged;BADL personal objects within reach;BADL personal routines maintained         Problem: Adult Inpatient Plan of Care  Goal: Plan of Care Review  Outcome: Ongoing (interventions implemented as appropriate)   03/16/19 1329   Plan of Care Review   Plan of Care Reviewed With patient       Problem: Skin Injury Risk Increased  Goal: Skin Health and Integrity  Outcome: Ongoing (interventions implemented as appropriate)  Intervention: Optimize Skin Protection   03/16/19 1329   Prevent Additional Skin Injury   Head of Bed (HOB) HOB elevated   Pressure Reduction Techniques frequent weight shift encouraged;heels elevated off bed;positioned off wounds   Monitor and Manage Hypervolemia   Skin Protection incontinence pads utilized

## 2019-03-16 NOTE — PLAN OF CARE
Problem: Behavioral Impairment (Dementia Signs/Symptoms)  Goal: Improved Behavioral Control (Dementia Signs/Symptoms)    Intervention: Manage Behavior and Mood   03/16/19 6776   Facilitate Re-Engagement and Participation   Diversional Activity television

## 2019-03-16 NOTE — PLAN OF CARE
Problem: Balance Impairment (Functional Deficit)  Goal: Improved Balance and Postural Control    Intervention: Optimize Balance and Safe Activity   03/16/19 0400   Optimize Balance and Safe Activity   Safety Promotion/Fall Prevention assistive device/personal item within reach;bed alarm set;commode/urinal/bedpan at bedside;Fall Risk reviewed with patient/family;high risk medications identified;lighting adjusted;medications reviewed;room near unit station;side rails raised x 3;instructed to call staff for mobility         Problem: Cognitive Impairment  Goal: Optimal Functional [place]    Intervention: Optimize Cognitive Function   03/15/19 1752 03/16/19 0524   Identify and Manage Contributors to Fall Injury Risk   Self-Care Promotion independence encouraged;BADL personal objects within reach;BADL personal routines maintained --    Manage Environment and Stimulation   Environment Familiarity/Consistency --  personal clothing/items utilized   Optimize Cognitive and Communication Skills   Reorientation Measures --  clock in view;reorientation provided   Prevent or Manage Pain   Sensory Stimulation Regulation --  care clustered;lighting decreased;quiet environment promoted         Problem: Coordination Impairment (Functional Deficit)  Goal: Optimal Coordination    Intervention: Optimize Motor Coordination and Functional Ability   03/15/19 1752   Identify and Manage Contributors to Fall Injury Risk   Self-Care Promotion independence encouraged;BADL personal objects within reach;BADL personal routines maintained         Problem: Muscle Strength Impairment  Goal: Improved Muscle Strength    Intervention: Optimize Muscle Strength   03/15/19 1752   Identify and Manage Contributors to Fall Injury Risk   Self-Care Promotion independence encouraged;BADL personal objects within reach;BADL personal routines maintained         Problem: Muscle Tone Impairment  Goal: Improved Muscle Tone    Intervention: Optimize Muscle Tone    03/16/19 0527   Optimize Muscle Tone   Spasticity Management spastic muscles stretched         Problem: Range of Motion Impairment (Functional Deficit)  Goal: Optimal Range of Motion    Intervention: Maintain Functional Joint Range and Position   03/16/19 0527   Optimize Functional Ability   Positioning/Transfer Devices in use;pillows   Range of Motion ROM (range of motion) performed         Problem: Sensory Impairment (Functional Deficit)  Goal: Compensation for Sensory Deficit    Intervention: Prevent Injury Related to Sensory Impairment   03/16/19 0527   Prevent Additional Skin Injury   Pressure Reduction Devices heel offloading device utilized;positioning supports utilized;pressure-redistributing mattress utilized   Monitor and Manage Hypervolemia   Skin Protection incontinence pads utilized

## 2019-03-16 NOTE — PROGRESS NOTES
Patient resting quietly in bed, 2L O2 NC & telemetry in place, no distress noted. Safety maintained, call light in reach,

## 2019-03-16 NOTE — PLAN OF CARE
Problem: Confusion Acute  Goal: Optimal Cognitive Function    Intervention: Minimize Factors Contributing to Confusion   03/16/19 0524   Manage Environment and Stimulation   Environment Familiarity/Consistency personal clothing/items utilized   Optimize Cognitive and Communication Skills   Reorientation Measures clock in view;reorientation provided   Prevent or Manage Pain   Sensory Stimulation Regulation care clustered;lighting decreased;quiet environment promoted

## 2019-03-16 NOTE — PLAN OF CARE
"[facility]  [address] [place] [address]    Facility Transfer Orders                        03/16/2019    Admit to: Regular bed    Diagnoses:  Active Hospital Problems    Diagnosis  POA    *Acute cystitis [N30.00]  Yes    Acute bacterial conjunctivitis of both eyes [H10.33]  Yes    Acute renal failure [N17.9]  Yes    Chronic anticoagulation [Z79.01]  Not Applicable     Chronic    Anemia of chronic disease [D63.8]  Yes     Chronic    History of CVA (cerebrovascular accident) [Z86.73]  Not Applicable     Chronic    Paroxysmal atrial fibrillation [I48.0]  Yes     Chronic    Essential hypertension [I10]  Yes     Chronic    Vascular dementia without behavioral disturbance [F01.50]  Yes     Chronic      Resolved Hospital Problems    Diagnosis Date Resolved POA    Severe sepsis [A41.9, R65.20] 03/15/2019 Yes    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, 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] 03/15/2019 Yes       Allergies:  Review of patient's allergies indicates:   Allergen Reactions    Aspirin Other (See Comments)     Pt states " I get nervous"       Vitals:  Per routine      Diet: Low sodium, mech soft, thin liquid, boost with meals      Activity:  Turn Q2H       Nursing Precautions:     - Aspiration precautions:             - Total assistance with meals            -  Upright 90 degrees befor during and after meals             -  Suction at bedside          - Fall precautions   - Seizure precaution   - Decubitus precautions:        -  for positioning   - Pressure reducing foam mattress   - Turn patient every two hours. Use wedge pillows to anchor patient        Medications: Discontinue all previous medication orders, if any. See new list below.     Nik Falk   Home Medication Instructions LISA:[id number]    Printed on:03/16/19 2809   Medication Information                      amlodipine (NORVASC) 10 MG tablet  Take 10 mg by mouth once daily.             apixaban (ELIQUIS) 5 mg Tab  Take 1 tablet (5 mg " total) by mouth 2 (two) times daily.             aspirin (ECOTRIN) 81 MG EC tablet  Take 81 mg by mouth once daily.             brimonidine-timolol (COMBIGAN) 0.2-0.5 % Drop  Place 1 drop into both eyes.             erythromycin (ROMYCIN) ophthalmic ointment  Place into both eyes every 4 (four) hours. for 7 days  Until 3/23/2019           hydrALAZINE (APRESOLINE) 25 MG tablet  Take 1 tablet (25 mg total) by mouth every 8 (eight) hours.             hydroCHLOROthiazide (HYDRODIURIL) 25 MG tablet  Take 1 tablet (25 mg total) by mouth once daily.             labetalol (NORMODYNE) 300 MG tablet  Take 1 tablet (300 mg total) by mouth every 12 (twelve) hours.             losartan (COZAAR) 100 MG tablet  Take 100 mg by mouth once daily.             sulfamethoxazole-trimethoprim 800-160mg (BACTRIM DS) 800-160 mg Tab  Take 1 tablet by mouth 2 (two) times daily. for 7 days  Until 3/23/2019                     _________________________________  Purnima Clarke MD  03/16/2019

## 2019-03-16 NOTE — CONSULTS
Patient will return to nursing home placement at [facility] with bactrim antibiotic

## 2019-03-17 PROBLEM — N17.9 ACUTE RENAL FAILURE: Status: RESOLVED | Noted: 2019-03-13 | Resolved: 2019-03-17

## 2019-03-17 LAB
BACTERIA BLD CULT: NORMAL
BACTERIA BLD CULT: NORMAL
BACTERIA UR CULT: NORMAL

## 2019-03-17 NOTE — DISCHARGE SUMMARY
[facility] Hospital Medicine  Discharge Summary      Patient Name: Nik Falk  MRN: [id number]  Admission Date: 3/12/2019  Hospital Length of Stay: 4 days  Discharge Date and Time: 3/16/2019  4:39 PM  Attending Physician: No att. providers found   Discharging Provider: Purnima Clarke MD  Primary Care Provider: Ronen Leong MD      HPI:   Mrs. Nik Falk is a 87 y.o. female [facility] resident with essential hypertension, paroxysmal atrial fibrillation ([id number] score 6) on chronic anticoagulation, anemia of chronic disease, dementia, and history of CVA who presents to [facility] ED with complaints of confusion today.  Her daughter reports that she is usually able to talk but today has become nonverbal and nonambulatory, only moaning and writhing around.  EMS was activated and they measured her blood pressure to be 88/44 after which they transported her to this facility.  Further history is limited at this time.    Hospital Course:   Ms. Falk is an 86 yo NH resident who presented with confusion and was found to have a UTI, sepsis, ARF, and metabolic derangements consistent with dehydration.  She was empirically started on ceftriaxone (recent culture with pan-sensitive E coli).  She was also started on IV fluids.  Mental started to improve as did renal function and metabolic derangements.  UCx with Proteus mirabilis and also E coli - both pan-sensitive and abx changed to PO Bactrim.  PT/OT assessed the patient during admission and she was felt to be at her prior level of function. She was stable to return to her NH to complete a course of PO antibiotics with PO Bactrim. Plan discussed with her daughter, Yamilex, on day of discharge. SW arranged for abx and return to her NH.     Consults:   Consults (From admission, onward)        Status Ordering Provider     IP consult to case management  Once     Provider:  (Not yet assigned)    Nahid CLARKE  RICHIE          * Acute cystitis    UA consistent with UTI. Empiric ceftriaxone until culture results. Met criteria for severe sepsis on admission. UCx E coli and Proteus mirabilis - pansensitive; abx changed to Bactrim. Continue abx to complete a 10 day course given severity of symptoms.     Acute bacterial conjunctivitis of both eyes    Purulent drainage from both eyes noted the morning following admission. Improved with erythromycin ointment. Continued on discharge to complete a 10 day course given severity.       Final Active Diagnoses:    Diagnosis Date Noted POA    PRINCIPAL PROBLEM:  Acute cystitis [N30.00] 03/12/2019 Yes    Acute bacterial conjunctivitis of both eyes [H10.33] 03/14/2019 Yes    Chronic anticoagulation [Z79.01] 03/13/2019 Not Applicable     Chronic    Anemia of chronic disease [D63.8] 03/13/2019 Yes     Chronic    History of CVA (cerebrovascular accident) [Z86.73] 03/13/2019 Not Applicable     Chronic    Paroxysmal atrial fibrillation [I48.0] 12/24/2018 Yes     Chronic    Essential hypertension [I10] 12/22/2018 Yes     Chronic    Vascular dementia without behavioral disturbance [F01.50] 12/22/2018 Yes     Chronic      Problems Resolved During this Admission:    Diagnosis Date Noted Date Resolved POA    Severe sepsis [A41.9, R65.20] 03/13/2019 03/15/2019 Yes    Acute renal failure [N17.9] 03/13/2019 03/17/2019 Yes    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    Specialty:  Internal Medicine  Contact information:  [address] [place] [id number]  [phone]             Critical access hospital.    Specialties:  Nursing Home Agency, SNF Agency  Contact information:  [address] [facility] [address] [phone]                 Patient Instructions:      Diet Cardiac     Notify your health care provider if you  experience any of the following:  increased confusion or weakness     Notify your health care provider if you experience any of the following:  persistent dizziness, light-headedness, or visual disturbances     Notify your health care provider if you experience any of the following:  worsening rash     Notify your health care provider if you experience any of the following:  severe persistent headache     Notify your health care provider if you experience any of the following:  difficulty breathing or increased cough     Notify your health care provider if you experience any of the following:  redness, tenderness, or signs of infection (pain, swelling, redness, odor or green/yellow discharge around incision site)     Notify your health care provider if you experience any of the following:  severe uncontrolled pain     Notify your health care provider if you experience any of the following:  persistent nausea and vomiting or diarrhea     Notify your health care provider if you experience any of the following:  temperature >100.4     Activity as tolerated       Medications:  Reconciled Home Medications:      Medication List      START taking these medications    erythromycin ophthalmic ointment  Commonly known as:  ROMYCIN  Place into both eyes every 4 (four) hours. for 7 days     sulfamethoxazole-trimethoprim 800-160mg 800-160 mg Tab  Commonly known as:  BACTRIM DS  Take 1 tablet by mouth 2 (two) times daily. for 7 days        CONTINUE taking these medications    amLODIPine 10 MG tablet  Commonly known as:  NORVASC  Take 10 mg by mouth once daily.     apixaban 5 mg Tab  Commonly known as:  ELIQUIS  Take 1 tablet (5 mg total) by mouth 2 (two) times daily.     aspirin 81 MG EC tablet  Commonly known as:  ECOTRIN  Take 81 mg by mouth once daily.     brimonidine-timolol 0.2-0.5 % Drop  Commonly known as:  COMBIGAN  Place 1 drop into both eyes.     hydrALAZINE 25 MG tablet  Commonly known as:  APRESOLINE  Take 1 tablet  (25 mg total) by mouth every 8 (eight) hours.     hydroCHLOROthiazide 25 MG tablet  Commonly known as:  HYDRODIURIL  Take 1 tablet (25 mg total) by mouth once daily.     labetalol 300 MG tablet  Commonly known as:  NORMODYNE  Take 1 tablet (300 mg total) by mouth every 12 (twelve) hours.     losartan 100 MG tablet  Commonly known as:  COZAAR  Take 100 mg by mouth once daily.            Indwelling Lines/Drains at time of discharge:   Lines/Drains/Airways          None          Time spent on the discharge of patient: 40 minutes  Patient was seen and examined on the date of discharge and determined to be suitable for discharge.         Purnima Clarke MD  Department of Hospital Medicine  [facility]

## 2019-03-17 NOTE — ASSESSMENT & PLAN NOTE
UA consistent with UTI. Empiric ceftriaxone until culture results. Met criteria for severe sepsis on admission. UCx E coli and Proteus mirabilis - pansensitive; abx changed to Bactrim. Continue abx to complete a 10 day course given severity of symptoms.

## 2019-03-17 NOTE — ASSESSMENT & PLAN NOTE
Purulent drainage from both eyes noted the morning following admission. Improved with erythromycin ointment. Continued on discharge to complete a 10 day course given severity.